# Patient Record
Sex: FEMALE | Race: WHITE | NOT HISPANIC OR LATINO | Employment: OTHER | URBAN - METROPOLITAN AREA
[De-identification: names, ages, dates, MRNs, and addresses within clinical notes are randomized per-mention and may not be internally consistent; named-entity substitution may affect disease eponyms.]

---

## 2023-04-24 ENCOUNTER — OFFICE VISIT (OUTPATIENT)
Dept: PRIMARY CARE | Facility: CLINIC | Age: 68
End: 2023-04-24
Payer: MEDICARE

## 2023-04-24 VITALS
SYSTOLIC BLOOD PRESSURE: 134 MMHG | BODY MASS INDEX: 24.5 KG/M2 | HEART RATE: 66 BPM | HEIGHT: 67 IN | DIASTOLIC BLOOD PRESSURE: 82 MMHG | WEIGHT: 156.1 LBS | RESPIRATION RATE: 16 BRPM | OXYGEN SATURATION: 95 % | TEMPERATURE: 97 F

## 2023-04-24 DIAGNOSIS — Z86.010 HX OF ADENOMATOUS COLONIC POLYPS: ICD-10-CM

## 2023-04-24 DIAGNOSIS — M85.80 OSTEOPENIA AFTER MENOPAUSE: ICD-10-CM

## 2023-04-24 DIAGNOSIS — G89.29 CHRONIC BILATERAL LOW BACK PAIN, UNSPECIFIED WHETHER SCIATICA PRESENT: ICD-10-CM

## 2023-04-24 DIAGNOSIS — M47.815 OSTEOARTHRITIS OF THORACOLUMBAR SPINE, UNSPECIFIED SPINAL OSTEOARTHRITIS COMPLICATION STATUS: ICD-10-CM

## 2023-04-24 DIAGNOSIS — Z78.0 OSTEOPENIA AFTER MENOPAUSE: ICD-10-CM

## 2023-04-24 DIAGNOSIS — I10 PRIMARY HYPERTENSION: Primary | ICD-10-CM

## 2023-04-24 DIAGNOSIS — M48.07 SPINAL STENOSIS OF LUMBOSACRAL REGION: ICD-10-CM

## 2023-04-24 DIAGNOSIS — M54.50 CHRONIC BILATERAL LOW BACK PAIN, UNSPECIFIED WHETHER SCIATICA PRESENT: ICD-10-CM

## 2023-04-24 PROBLEM — E78.5 HYPERLIPEMIA: Status: ACTIVE | Noted: 2023-04-24

## 2023-04-24 PROCEDURE — 3079F DIAST BP 80-89 MM HG: CPT | Performed by: FAMILY MEDICINE

## 2023-04-24 PROCEDURE — 3075F SYST BP GE 130 - 139MM HG: CPT | Performed by: FAMILY MEDICINE

## 2023-04-24 PROCEDURE — 99215 OFFICE O/P EST HI 40 MIN: CPT | Performed by: FAMILY MEDICINE

## 2023-04-24 PROCEDURE — 1036F TOBACCO NON-USER: CPT | Performed by: FAMILY MEDICINE

## 2023-04-24 PROCEDURE — 1160F RVW MEDS BY RX/DR IN RCRD: CPT | Performed by: FAMILY MEDICINE

## 2023-04-24 PROCEDURE — 1159F MED LIST DOCD IN RCRD: CPT | Performed by: FAMILY MEDICINE

## 2023-04-24 RX ORDER — SULFACETAMIDE SODIUM, SULFUR 98; 48 MG/G; MG/G
LIQUID TOPICAL
COMMUNITY
Start: 2021-04-22

## 2023-04-24 RX ORDER — VIT C/E/ZN/COPPR/LUTEIN/ZEAXAN 250MG-90MG
1 CAPSULE ORAL DAILY
COMMUNITY
Start: 2021-04-22

## 2023-04-24 RX ORDER — ACETAMINOPHEN AND PHENYLEPHRINE HCL 325; 5 MG/1; MG/1
10000 TABLET ORAL
COMMUNITY

## 2023-04-24 RX ORDER — LISINOPRIL 10 MG/1
1 TABLET ORAL DAILY
COMMUNITY
Start: 2023-01-24 | End: 2023-04-24 | Stop reason: SDUPTHER

## 2023-04-24 RX ORDER — MULTIVITAMIN
1 TABLET ORAL DAILY
COMMUNITY

## 2023-04-24 RX ORDER — IVERMECTIN 10 MG/G
1 CREAM TOPICAL
COMMUNITY
Start: 2022-08-08 | End: 2023-08-08

## 2023-04-24 RX ORDER — CLOBETASOL PROPIONATE 0.5 MG/G
1 CREAM TOPICAL 2 TIMES DAILY
COMMUNITY
Start: 2021-03-25

## 2023-04-24 RX ORDER — LISINOPRIL 20 MG/1
20 TABLET ORAL DAILY
Qty: 90 TABLET | Refills: 0 | Status: SHIPPED | OUTPATIENT
Start: 2023-04-24 | End: 2023-05-23 | Stop reason: SDUPTHER

## 2023-04-24 ASSESSMENT — ENCOUNTER SYMPTOMS
NAUSEA: 1
LOSS OF SENSATION IN FEET: 0
HYPERTENSION: 1
HEADACHES: 1
DEPRESSION: 0
OCCASIONAL FEELINGS OF UNSTEADINESS: 0

## 2023-04-24 ASSESSMENT — PATIENT HEALTH QUESTIONNAIRE - PHQ9
1. LITTLE INTEREST OR PLEASURE IN DOING THINGS: NOT AT ALL
SUM OF ALL RESPONSES TO PHQ9 QUESTIONS 1 AND 2: 0
2. FEELING DOWN, DEPRESSED OR HOPELESS: NOT AT ALL

## 2023-04-24 NOTE — PATIENT INSTRUCTIONS
April 24, 2023    Clay Maxwell  [address] [facility] [address]      Dear Ms. Maxwell:     To help you more effectively manage your high blood pressure, we would like to remind you of the following preventive measures you can take at home:    1) Eat right: Your diet should be rich in fruits, vegetables, potassium, and low-fat dairy products. You should also reduce your intake of sodium and fats, particularly saturated fats.    2) Maintain a healthy weight: Try to achieve and maintain a healthy weight. If you are unsure what this means for you, please contact our clinic.    3) Exercise: Try to get at least 30 minutes of aerobic exercise every day.    4) Moderate your alcohol consumption: Limit your alcohol intake to one drink per day.    5) Monitor your blood pressure: You should check your blood pressure regularly. Notify our clinic if your blood pressure readings are consistently higher than recommended.    We have included a personalized hypertension report card on the following page that lists your most recent blood pressure readings and lab results, along with your ideal values. If you have any questions or concerns about these instructions, your results, or your improving health, please don't hesitate to call.    Thank you for including us as members of your health care team.    [unfilled]    Sincerely,         Amy Del Valle MD    Enclosure      Hypertension Report Card for Clay Maxwell  April 24, 2023:     Below is a summary of recent tests related to your hypertension that can help you manage your health.     Blood Pressure:   Normal blood pressure values are 120/80 or lower. Your blood pressure values should be less than 140/90. If your readings at home are consistently higher than this, please contact me.     Your most recent blood pressure readings at our clinic were:   BP Readings from Last 3 Encounters:   04/24/23 134/82   07/26/22 120/78   02/01/22 124/82       Creatinine:   High blood  pressure can cause a loss of kidney function. Creatinine is a measure of this kidney function.     Your most recent creatinine levels were:   Creatinine (mg/dL)   Date Value   11/10/2022 0.60   05/27/2021 0.70   10/02/2019 0.65           Potassium:  Potassium is an electrolyte in your blood that can be affected by blood pressure treatment.    Your most recent potassium levels were:  Potassium (mmol/L)   Date Value   11/10/2022 3.8   05/27/2021 4.5   10/02/2019 4.2

## 2023-04-24 NOTE — ASSESSMENT & PLAN NOTE
Hypertension Assessment  Primary hypertension [I10]  Discussion: stage 1  Cardiovascular risk factors: advanced age (older than 55 for men, 65 for women) and family history of premature cardiovascular disease    Hypertension Plan  Medication changes per orders.  Dietary sodium restriction.  Regular aerobic exercise.  Follow up in 1 month.  Patient Education: Reviewed risks of hypertension and principles of   treatment.  Counseling time: counseling time more than 50% of visit: 45 minutes.

## 2023-04-24 NOTE — PROGRESS NOTES
"Subjective   Patient ID: Clay Maxwell is a 67 y.o. female who presents for 3 month follow up  (Review MRI results and Bone Density).  Today she is accompanied by alone. DEXA Scan and MRI scans of Lumbosacral spine and thoracic spine reviewed and discussed in detail.     Hypertension  This is a chronic problem. The current episode started more than 1 year ago. The problem is unchanged (more challenging as she is getting older and if/when gains weight). The problem is controlled. Associated symptoms include headaches. (Jitteriness, nausea; headaches when BP elevated >140/90 mmHg, ) Associated agents: caffeine, diet soda. Risk factors for coronary artery disease include family history and dyslipidemia. Past treatments include ACE inhibitors and lifestyle changes. The current treatment provides significant improvement. There are no compliance problems.      She has had chronic LBP for years, stretches help. Minimal radiation of pain to anterior proximal thigh b/l. PT, remaining active and weight loss have helped alleviate pain moderately.       Review of Systems   Gastrointestinal:  Positive for nausea.   Neurological:  Positive for headaches.        Headaches only when bp >140 mmHg, episodes last ~ 3-4 hour long in AM; dull ache, 2-3/10.    Psychiatric/Behavioral:          Jitteriness    All other systems reviewed and are negative.      Objective   /82   Pulse 66   Temp 36.1 °C (97 °F)   Resp 16   Ht 1.695 m (5' 6.75\")   Wt 70.8 kg (156 lb 1.6 oz)   SpO2 95%   BMI 24.63 kg/m²   BSA: 1.83 meters squared  Growth percentiles: Facility age limit for growth %justen is 20 years. Facility age limit for growth %justen is 20 years.     Physical Exam  Vitals and nursing note reviewed.   Constitutional:       General: She is not in acute distress.     Appearance: Normal appearance. She is normal weight. She is not ill-appearing or toxic-appearing.   HENT:      Head: Normocephalic.      Right Ear: Tympanic membrane, ear " canal and external ear normal. There is no impacted cerumen.      Left Ear: Tympanic membrane, ear canal and external ear normal. There is no impacted cerumen.      Nose: Nose normal. No congestion or rhinorrhea.      Mouth/Throat:      Mouth: Mucous membranes are moist.      Pharynx: Oropharynx is clear. No oropharyngeal exudate or posterior oropharyngeal erythema.   Eyes:      General: No scleral icterus.        Right eye: No discharge.         Left eye: No discharge.      Extraocular Movements: Extraocular movements intact.      Conjunctiva/sclera: Conjunctivae normal.      Pupils: Pupils are equal, round, and reactive to light.   Neck:      Vascular: No carotid bruit.   Cardiovascular:      Rate and Rhythm: Normal rate and regular rhythm.      Pulses: Normal pulses.      Heart sounds: Normal heart sounds. No murmur heard.     No gallop.   Pulmonary:      Effort: No respiratory distress.      Breath sounds: No wheezing or rhonchi.   Chest:      Chest wall: No tenderness.   Abdominal:      General: Abdomen is flat. Bowel sounds are normal.      Palpations: Abdomen is soft. There is no mass.      Tenderness: There is no abdominal tenderness. There is no guarding or rebound.      Hernia: No hernia is present.   Musculoskeletal:         General: No swelling or tenderness. Normal range of motion.      Cervical back: Normal range of motion. No rigidity or tenderness.      Right lower leg: No edema.      Left lower leg: No edema.   Lymphadenopathy:      Cervical: No cervical adenopathy.   Skin:     General: Skin is warm and dry.      Coloration: Skin is not pale.      Findings: No bruising, erythema or rash.   Neurological:      General: No focal deficit present.      Mental Status: She is alert and oriented to person, place, and time.      Sensory: No sensory deficit.      Coordination: Coordination normal.      Gait: Gait normal.      Deep Tendon Reflexes: Reflexes normal.   Psychiatric:         Mood and Affect: Mood  normal.         Behavior: Behavior normal.         Thought Content: Thought content normal.         Judgment: Judgment normal.         Assessment/Plan   Problem List Items Addressed This Visit       Primary hypertension - Primary (Chronic)     Hypertension Assessment  Primary hypertension [I10]  Discussion: stage 1  Cardiovascular risk factors: advanced age (older than 55 for men, 65 for women) and family history of premature cardiovascular disease    Hypertension Plan  Medication changes per orders.  Dietary sodium restriction.  Regular aerobic exercise.  Follow up in 1 month.  Patient Education: Reviewed risks of hypertension and principles of   treatment.  Counseling time: counseling time more than 50% of visit: 45 minutes.         Relevant Medications    lisinopril 20 mg tablet    Other Relevant Orders    Follow Up In [facility] - PCP    Hx of adenomatous colonic polyps    Chronic bilateral low back pain    Spinal stenosis of lumbosacral region     Assessment/Plan   Spinal stenosis.  Natural history and expected course discussed. Questions answered.  Proper lifting, bending technique discussed.  Stretching exercises discussed.  Regular aerobic and trunk strengthening exercises discussed.  MRI of the affected area discussed.  OTC analgesics as needed.  Follow-up in  as needed if develops pain.  .         Osteoarthritis of thoracolumbar spine    Osteopenia after menopause

## 2023-04-24 NOTE — ASSESSMENT & PLAN NOTE
Assessment/Plan   Spinal stenosis.  Natural history and expected course discussed. Questions answered.  Proper lifting, bending technique discussed.  Stretching exercises discussed.  Regular aerobic and trunk strengthening exercises discussed.  MRI of the affected area discussed.  OTC analgesics as needed.  Follow-up in  as needed if develops pain.  .

## 2023-05-22 ASSESSMENT — ENCOUNTER SYMPTOMS
NECK PAIN: 0
PND: 0
ORTHOPNEA: 0
SHORTNESS OF BREATH: 0
HYPERTENSION: 1
HEADACHES: 0
PALPITATIONS: 0
BLURRED VISION: 0
SWEATS: 0

## 2023-05-23 ENCOUNTER — OFFICE VISIT (OUTPATIENT)
Dept: PRIMARY CARE | Facility: CLINIC | Age: 68
End: 2023-05-23
Payer: MEDICARE

## 2023-05-23 ENCOUNTER — TELEPHONE (OUTPATIENT)
Dept: PRIMARY CARE | Facility: CLINIC | Age: 68
End: 2023-05-23

## 2023-05-23 VITALS
TEMPERATURE: 97.2 F | OXYGEN SATURATION: 98 % | SYSTOLIC BLOOD PRESSURE: 122 MMHG | WEIGHT: 157.4 LBS | HEART RATE: 74 BPM | RESPIRATION RATE: 16 BRPM | HEIGHT: 66 IN | BODY MASS INDEX: 25.3 KG/M2 | DIASTOLIC BLOOD PRESSURE: 78 MMHG

## 2023-05-23 DIAGNOSIS — I10 PRIMARY HYPERTENSION: Chronic | ICD-10-CM

## 2023-05-23 DIAGNOSIS — I10 PRIMARY HYPERTENSION: ICD-10-CM

## 2023-05-23 DIAGNOSIS — E53.8 VITAMIN B 12 DEFICIENCY: ICD-10-CM

## 2023-05-23 DIAGNOSIS — D64.9 ANEMIA, UNSPECIFIED TYPE: ICD-10-CM

## 2023-05-23 DIAGNOSIS — E78.2 MIXED HYPERLIPIDEMIA: ICD-10-CM

## 2023-05-23 DIAGNOSIS — E55.9 VITAMIN D DEFICIENCY: ICD-10-CM

## 2023-05-23 PROCEDURE — 1159F MED LIST DOCD IN RCRD: CPT | Performed by: FAMILY MEDICINE

## 2023-05-23 PROCEDURE — 3078F DIAST BP <80 MM HG: CPT | Performed by: FAMILY MEDICINE

## 2023-05-23 PROCEDURE — 1160F RVW MEDS BY RX/DR IN RCRD: CPT | Performed by: FAMILY MEDICINE

## 2023-05-23 PROCEDURE — 3074F SYST BP LT 130 MM HG: CPT | Performed by: FAMILY MEDICINE

## 2023-05-23 PROCEDURE — 99214 OFFICE O/P EST MOD 30 MIN: CPT | Performed by: FAMILY MEDICINE

## 2023-05-23 PROCEDURE — 1036F TOBACCO NON-USER: CPT | Performed by: FAMILY MEDICINE

## 2023-05-23 RX ORDER — LISINOPRIL 20 MG/1
20 TABLET ORAL DAILY
Qty: 90 TABLET | Refills: 1 | Status: SHIPPED | OUTPATIENT
Start: 2023-05-23 | End: 2023-12-05 | Stop reason: SDUPTHER

## 2023-05-23 ASSESSMENT — ENCOUNTER SYMPTOMS
HEADACHES: 0
NECK PAIN: 0
DEPRESSION: 0
SWEATS: 0
PALPITATIONS: 0
OCCASIONAL FEELINGS OF UNSTEADINESS: 0
ORTHOPNEA: 0
SHORTNESS OF BREATH: 0
HYPERTENSION: 1
PND: 0
LOSS OF SENSATION IN FEET: 0
BLURRED VISION: 0

## 2023-05-23 ASSESSMENT — PATIENT HEALTH QUESTIONNAIRE - PHQ9
1. LITTLE INTEREST OR PLEASURE IN DOING THINGS: NOT AT ALL
2. FEELING DOWN, DEPRESSED OR HOPELESS: NOT AT ALL
SUM OF ALL RESPONSES TO PHQ9 QUESTIONS 1 AND 2: 0

## 2023-05-23 NOTE — PROGRESS NOTES
"Subjective   Patient ID: Clay Maxwell is a 67 y.o. female who presents for follow up on hypertension.  Today she is accompanied by alone.     Hypertension  This is a recurrent problem. The current episode started more than 1 year ago. The problem has been waxing and waning since onset. The problem is controlled. Pertinent negatives include no anxiety, blurred vision, chest pain, headaches, malaise/fatigue, neck pain, orthopnea, palpitations, peripheral edema, PND, shortness of breath or sweats. Agents associated with hypertension include NSAIDs. Risk factors for coronary artery disease include dyslipidemia, family history and post-menopausal state. There are no compliance problems.        Review of Systems   Constitutional:  Negative for malaise/fatigue.   Eyes:  Negative for blurred vision.   Respiratory:  Negative for shortness of breath.    Cardiovascular:  Negative for chest pain, palpitations, orthopnea and PND.   Musculoskeletal:  Negative for neck pain.   Neurological:  Negative for headaches.   All other systems reviewed and are negative.      Objective   Blood Pressure 122/78   Pulse 74   Temperature 36.2 °C (97.2 °F)   Respiration 16   Height 1.676 m (5' 6\")   Weight 71.4 kg (157 lb 6.4 oz)   Oxygen Saturation 98%   Body Mass Index 25.41 kg/m²   BSA: 1.82 meters squared  Growth percentiles: Facility age limit for growth %justen is 20 years. Facility age limit for growth %justen is 20 years.     Physical Exam  Vitals and nursing note reviewed.   Constitutional:       General: She is not in acute distress.     Appearance: Normal appearance. She is normal weight. She is not ill-appearing.   HENT:      Head: Normocephalic.      Right Ear: Tympanic membrane, ear canal and external ear normal.      Left Ear: Tympanic membrane, ear canal and external ear normal.      Nose: Nose normal. No rhinorrhea.      Mouth/Throat:      Mouth: Mucous membranes are moist.      Pharynx: Oropharynx is clear.   Eyes:      " Extraocular Movements: Extraocular movements intact.      Conjunctiva/sclera: Conjunctivae normal.      Pupils: Pupils are equal, round, and reactive to light.   Cardiovascular:      Rate and Rhythm: Normal rate and regular rhythm.      Pulses: Normal pulses.      Heart sounds: Normal heart sounds.   Pulmonary:      Effort: Pulmonary effort is normal. No respiratory distress.      Breath sounds: Normal breath sounds. No wheezing.   Abdominal:      General: Abdomen is flat. Bowel sounds are normal.      Palpations: Abdomen is soft.      Tenderness: There is no abdominal tenderness. There is no guarding or rebound.      Hernia: No hernia is present.   Musculoskeletal:         General: Normal range of motion.      Cervical back: Normal range of motion and neck supple. No rigidity or tenderness.      Right lower leg: No edema.      Left lower leg: No edema.   Lymphadenopathy:      Cervical: No cervical adenopathy.   Skin:     General: Skin is warm and dry.      Capillary Refill: Capillary refill takes more than 3 seconds.   Neurological:      General: No focal deficit present.      Mental Status: She is alert and oriented to person, place, and time.      Sensory: No sensory deficit.      Motor: No weakness.      Coordination: Coordination normal.   Psychiatric:         Mood and Affect: Mood normal.         Behavior: Behavior normal.         Thought Content: Thought content normal.         Judgment: Judgment normal.         Assessment/Plan   Problem List Items Addressed This Visit       Primary hypertension (Chronic)    Relevant Medications    lisinopril 20 mg tablet

## 2023-05-23 NOTE — PATIENT INSTRUCTIONS
F/up in 6 months for Medicare  PE and HTN   FBW prior to next visit  Pneumonia shot next visit      May 23, 2023    Clay Maxwell  [address] [facility] [address]      Dear Ms. Maxwell:     To help you more effectively manage your high blood pressure, we would like to remind you of the following preventive measures you can take at home:    1) Eat right: Your diet should be rich in fruits, vegetables, potassium, and low-fat dairy products. You should also reduce your intake of sodium and fats, particularly saturated fats.    2) Maintain a healthy weight: Try to achieve and maintain a healthy weight. If you are unsure what this means for you, please contact our clinic.    3) Exercise: Try to get at least 30 minutes of aerobic exercise every day.    4) Moderate your alcohol consumption: Limit your alcohol intake to one drink per day.    5) Monitor your blood pressure: You should check your blood pressure regularly. Notify our clinic if your blood pressure readings are consistently higher than recommended.    We have included a personalized hypertension report card on the following page that lists your most recent blood pressure readings and lab results, along with your ideal values. If you have any questions or concerns about these instructions, your results, or your improving health, please don't hesitate to call.    Thank you for including us as members of your health care team.    [unfilled]    Sincerely,         Amy Del Valle MD    Enclosure      Hypertension Report Card for Clay Maxwell  May 23, 2023:     Below is a summary of recent tests related to your hypertension that can help you manage your health.     Blood Pressure:   Normal blood pressure values are 120/80 or lower. Your blood pressure values should be less than 140/90. If your readings at home are consistently higher than this, please contact me.     Your most recent blood pressure readings at our clinic were:   BP Readings from Last 3  Encounters:   05/23/23 122/78   04/24/23 134/82   01/24/23 124/80       Creatinine:   High blood pressure can cause a loss of kidney function. Creatinine is a measure of this kidney function.      Your most recent creatinine levels were:   Creatinine (mg/dL)   Date Value   11/10/2022 0.60   05/27/2021 0.70   10/02/2019 0.65           Potassium:  Potassium is an electrolyte in your blood that can be affected by blood pressure treatment.       Your most recent potassium levels were:  Potassium (mmol/L)   Date Value   11/10/2022 3.8   05/27/2021 4.5   10/02/2019 4.2

## 2023-11-14 ENCOUNTER — APPOINTMENT (OUTPATIENT)
Dept: PRIMARY CARE | Facility: CLINIC | Age: 68
End: 2023-11-14
Payer: MEDICARE

## 2023-11-21 ENCOUNTER — APPOINTMENT (OUTPATIENT)
Dept: PRIMARY CARE | Facility: CLINIC | Age: 68
End: 2023-11-21
Payer: MEDICARE

## 2023-11-30 ENCOUNTER — LAB (OUTPATIENT)
Dept: LAB | Facility: LAB | Age: 68
End: 2023-11-30
Payer: MEDICARE

## 2023-11-30 DIAGNOSIS — D64.9 ANEMIA, UNSPECIFIED TYPE: ICD-10-CM

## 2023-11-30 DIAGNOSIS — E78.2 MIXED HYPERLIPIDEMIA: ICD-10-CM

## 2023-11-30 DIAGNOSIS — E53.8 VITAMIN B 12 DEFICIENCY: ICD-10-CM

## 2023-11-30 DIAGNOSIS — I10 PRIMARY HYPERTENSION: Chronic | ICD-10-CM

## 2023-11-30 DIAGNOSIS — E55.9 VITAMIN D DEFICIENCY: ICD-10-CM

## 2023-11-30 LAB
25(OH)D3 SERPL-MCNC: 58 NG/ML (ref 30–100)
ALBUMIN SERPL BCP-MCNC: 4.4 G/DL (ref 3.4–5)
ALP SERPL-CCNC: 71 U/L (ref 33–136)
ALT SERPL W P-5'-P-CCNC: 15 U/L (ref 7–45)
ANION GAP SERPL CALC-SCNC: 13 MMOL/L (ref 10–20)
AST SERPL W P-5'-P-CCNC: 22 U/L (ref 9–39)
BASOPHILS # BLD AUTO: 0.03 X10*3/UL (ref 0–0.1)
BASOPHILS NFR BLD AUTO: 0.6 %
BILIRUB SERPL-MCNC: 0.6 MG/DL (ref 0–1.2)
BUN SERPL-MCNC: 13 MG/DL (ref 6–23)
CALCIUM SERPL-MCNC: 9.5 MG/DL (ref 8.6–10.6)
CHLORIDE SERPL-SCNC: 99 MMOL/L (ref 98–107)
CHOLEST SERPL-MCNC: 175 MG/DL (ref 0–199)
CHOLESTEROL/HDL RATIO: 4.1
CO2 SERPL-SCNC: 29 MMOL/L (ref 21–32)
CREAT SERPL-MCNC: 0.58 MG/DL (ref 0.5–1.05)
EOSINOPHIL # BLD AUTO: 0.06 X10*3/UL (ref 0–0.7)
EOSINOPHIL NFR BLD AUTO: 1.2 %
ERYTHROCYTE [DISTWIDTH] IN BLOOD BY AUTOMATED COUNT: 13.2 % (ref 11.5–14.5)
GFR SERPL CREATININE-BSD FRML MDRD: >90 ML/MIN/1.73M*2
GLUCOSE SERPL-MCNC: 81 MG/DL (ref 74–99)
HCT VFR BLD AUTO: 40.1 % (ref 36–46)
HDLC SERPL-MCNC: 42.7 MG/DL
HGB BLD-MCNC: 13 G/DL (ref 12–16)
IMM GRANULOCYTES # BLD AUTO: 0.01 X10*3/UL (ref 0–0.7)
IMM GRANULOCYTES NFR BLD AUTO: 0.2 % (ref 0–0.9)
LDLC SERPL CALC-MCNC: 98 MG/DL
LYMPHOCYTES # BLD AUTO: 1.35 X10*3/UL (ref 1.2–4.8)
LYMPHOCYTES NFR BLD AUTO: 27.9 %
MCH RBC QN AUTO: 30 PG (ref 26–34)
MCHC RBC AUTO-ENTMCNC: 32.4 G/DL (ref 32–36)
MCV RBC AUTO: 93 FL (ref 80–100)
MONOCYTES # BLD AUTO: 0.56 X10*3/UL (ref 0.1–1)
MONOCYTES NFR BLD AUTO: 11.6 %
NEUTROPHILS # BLD AUTO: 2.83 X10*3/UL (ref 1.2–7.7)
NEUTROPHILS NFR BLD AUTO: 58.5 %
NON HDL CHOLESTEROL: 132 MG/DL (ref 0–149)
NRBC BLD-RTO: 0 /100 WBCS (ref 0–0)
PLATELET # BLD AUTO: 242 X10*3/UL (ref 150–450)
POTASSIUM SERPL-SCNC: 3.9 MMOL/L (ref 3.5–5.3)
PROT SERPL-MCNC: 7.2 G/DL (ref 6.4–8.2)
RBC # BLD AUTO: 4.33 X10*6/UL (ref 4–5.2)
SODIUM SERPL-SCNC: 137 MMOL/L (ref 136–145)
TRIGL SERPL-MCNC: 172 MG/DL (ref 0–149)
TSH SERPL-ACNC: 2.07 MIU/L (ref 0.44–3.98)
VIT B12 SERPL-MCNC: 495 PG/ML (ref 211–911)
VLDL: 34 MG/DL (ref 0–40)
WBC # BLD AUTO: 4.8 X10*3/UL (ref 4.4–11.3)

## 2023-11-30 PROCEDURE — 82607 VITAMIN B-12: CPT

## 2023-11-30 PROCEDURE — 85025 COMPLETE CBC W/AUTO DIFF WBC: CPT

## 2023-11-30 PROCEDURE — 80061 LIPID PANEL: CPT

## 2023-11-30 PROCEDURE — 36415 COLL VENOUS BLD VENIPUNCTURE: CPT

## 2023-11-30 PROCEDURE — 84443 ASSAY THYROID STIM HORMONE: CPT

## 2023-11-30 PROCEDURE — 82306 VITAMIN D 25 HYDROXY: CPT

## 2023-11-30 PROCEDURE — 80053 COMPREHEN METABOLIC PANEL: CPT

## 2023-12-05 ENCOUNTER — OFFICE VISIT (OUTPATIENT)
Dept: PRIMARY CARE | Facility: CLINIC | Age: 68
End: 2023-12-05
Payer: MEDICARE

## 2023-12-05 ENCOUNTER — ANCILLARY PROCEDURE (OUTPATIENT)
Dept: RADIOLOGY | Facility: CLINIC | Age: 68
End: 2023-12-05
Payer: MEDICARE

## 2023-12-05 VITALS
SYSTOLIC BLOOD PRESSURE: 126 MMHG | OXYGEN SATURATION: 96 % | HEART RATE: 68 BPM | HEIGHT: 66 IN | WEIGHT: 154.5 LBS | DIASTOLIC BLOOD PRESSURE: 78 MMHG | BODY MASS INDEX: 24.83 KG/M2

## 2023-12-05 DIAGNOSIS — M54.42 CHRONIC BILATERAL LOW BACK PAIN WITH LEFT-SIDED SCIATICA: ICD-10-CM

## 2023-12-05 DIAGNOSIS — M54.50 ACUTE BILATERAL LOW BACK PAIN, UNSPECIFIED WHETHER SCIATICA PRESENT: ICD-10-CM

## 2023-12-05 DIAGNOSIS — M48.07 SPINAL STENOSIS OF LUMBOSACRAL REGION: ICD-10-CM

## 2023-12-05 DIAGNOSIS — Z13.89 SCREENING FOR MULTIPLE CONDITIONS: ICD-10-CM

## 2023-12-05 DIAGNOSIS — Z71.89 CARDIAC RISK COUNSELING: ICD-10-CM

## 2023-12-05 DIAGNOSIS — Z00.00 ROUTINE GENERAL MEDICAL EXAMINATION AT HEALTH CARE FACILITY: Primary | ICD-10-CM

## 2023-12-05 DIAGNOSIS — G89.29 CHRONIC BILATERAL LOW BACK PAIN WITH LEFT-SIDED SCIATICA: ICD-10-CM

## 2023-12-05 DIAGNOSIS — E78.2 MIXED HYPERLIPIDEMIA: ICD-10-CM

## 2023-12-05 DIAGNOSIS — I10 ESSENTIAL (PRIMARY) HYPERTENSION: ICD-10-CM

## 2023-12-05 DIAGNOSIS — Z23 NEED FOR VACCINATION: ICD-10-CM

## 2023-12-05 PROCEDURE — 99214 OFFICE O/P EST MOD 30 MIN: CPT | Performed by: FAMILY MEDICINE

## 2023-12-05 PROCEDURE — 72110 X-RAY EXAM L-2 SPINE 4/>VWS: CPT | Performed by: STUDENT IN AN ORGANIZED HEALTH CARE EDUCATION/TRAINING PROGRAM

## 2023-12-05 PROCEDURE — 1033F TOBACCO NONSMOKER NOR 2NDHND: CPT | Performed by: FAMILY MEDICINE

## 2023-12-05 PROCEDURE — G0439 PPPS, SUBSEQ VISIT: HCPCS | Performed by: FAMILY MEDICINE

## 2023-12-05 PROCEDURE — G0009 ADMIN PNEUMOCOCCAL VACCINE: HCPCS | Performed by: FAMILY MEDICINE

## 2023-12-05 PROCEDURE — 3078F DIAST BP <80 MM HG: CPT | Performed by: FAMILY MEDICINE

## 2023-12-05 PROCEDURE — G0446 INTENS BEHAVE THER CARDIO DX: HCPCS | Performed by: FAMILY MEDICINE

## 2023-12-05 PROCEDURE — 1159F MED LIST DOCD IN RCRD: CPT | Performed by: FAMILY MEDICINE

## 2023-12-05 PROCEDURE — G0444 DEPRESSION SCREEN ANNUAL: HCPCS | Performed by: FAMILY MEDICINE

## 2023-12-05 PROCEDURE — 1160F RVW MEDS BY RX/DR IN RCRD: CPT | Performed by: FAMILY MEDICINE

## 2023-12-05 PROCEDURE — 3074F SYST BP LT 130 MM HG: CPT | Performed by: FAMILY MEDICINE

## 2023-12-05 PROCEDURE — 1170F FXNL STATUS ASSESSED: CPT | Performed by: FAMILY MEDICINE

## 2023-12-05 PROCEDURE — 1036F TOBACCO NON-USER: CPT | Performed by: FAMILY MEDICINE

## 2023-12-05 PROCEDURE — 99497 ADVNCD CARE PLAN 30 MIN: CPT | Performed by: FAMILY MEDICINE

## 2023-12-05 PROCEDURE — 90677 PCV20 VACCINE IM: CPT | Performed by: FAMILY MEDICINE

## 2023-12-05 PROCEDURE — 72110 X-RAY EXAM L-2 SPINE 4/>VWS: CPT

## 2023-12-05 RX ORDER — ACETAMINOPHEN 500 MG
500 TABLET ORAL 2 TIMES DAILY
COMMUNITY

## 2023-12-05 RX ORDER — LISINOPRIL 20 MG/1
20 TABLET ORAL DAILY
Qty: 90 TABLET | Refills: 1 | Status: SHIPPED | OUTPATIENT
Start: 2023-12-05

## 2023-12-05 RX ORDER — METHYLPREDNISOLONE 4 MG/1
TABLET ORAL
Qty: 21 TABLET | Refills: 0 | Status: SHIPPED | OUTPATIENT
Start: 2023-12-05 | End: 2023-12-12

## 2023-12-05 RX ORDER — FAMOTIDINE 20 MG/1
20 TABLET, FILM COATED ORAL 2 TIMES DAILY
Qty: 14 TABLET | Refills: 0 | COMMUNITY
Start: 2023-12-05 | End: 2024-01-29 | Stop reason: WASHOUT

## 2023-12-05 ASSESSMENT — ENCOUNTER SYMPTOMS
SLEEP DISTURBANCE: 0
ABDOMINAL PAIN: 0
DEPRESSION: 0
OCCASIONAL FEELINGS OF UNSTEADINESS: 0
COUGH: 0
LOSS OF SENSATION IN FEET: 0
BACK PAIN: 1

## 2023-12-05 ASSESSMENT — ACTIVITIES OF DAILY LIVING (ADL)
BATHING: INDEPENDENT
GROCERY_SHOPPING: INDEPENDENT
DRESSING: INDEPENDENT
TAKING_MEDICATION: INDEPENDENT
MANAGING_FINANCES: INDEPENDENT
DOING_HOUSEWORK: INDEPENDENT

## 2023-12-05 NOTE — PROGRESS NOTES
Subjective   Reason for Visit: Margaret Maxwell is an 68 y.o. female here for a Medicare Wellness visit.     Past Medical, Surgical, and Family History reviewed and updated in chart.    Reviewed all medications by prescribing practitioner or clinical pharmacist (such as prescriptions, OTCs, herbal therapies and supplements) and documented in the medical record.    67 y/o female presents for her medicare annual wellness visit. Her only complaint at this time is left posterior leg pain radiating from her lower back that started about 3 weeks ago. She has a history of lower lumbar problems but this radiating pain is new. It is relieved by stretching but has been occurring frequently over the last 2-3 weeks. She has also occasionally tried heat pads and ice packs with mild improvement as well as extra strength Tylenol. She feels as though her balance is better on her right leg and she sometimes has trouble bearing full weight on her left leg due to pain. She denies any recent injury and reports that she does not do any lifting. She also notes some burning in her left scapular region that is new. She denies any bowel or bladder incontinence, numbness or tingling and she has no trouble sleeping at night or doing her daily activities.     Back Pain  Pertinent negatives include no abdominal pain or chest pain.         Advance Care Planning Note     Discussion Date: 12/05/23   Discussion Participants: patient    The patient wishes to discuss Advance Care Planning today and the following is a brief summary of our discussion.     Patient has capacity to make their own medical decisions: Yes  Health Care Agent/Surrogate Decision Maker documented in chart: Yes    Documents on file and valid:  Advance Directive/Living Will: No   Health Care Power of : No  Other: full code discussed    Communication of Medical Status/Prognosis:   great     Communication of Treatment Goals/Options:   Needs to bring in documents  "    Treatment Decisions  Goals of Care: survival is prioritized, if goals for quality or survival can reasonably be achieved   agree  Follow Up Plan  Bring in documents  Team Members  pcp  Time Statement: Total face to face time spent on advance care planning was 16 minutes with 16 minutes spent in counseling, including the explanation.    Amy Del Valle MD  12/5/2023 9:55 AM  Patient Care Team:  Amy Del Valle MD as PCP - General  Amy Del Valle MD as PCP - [facility] Attributed Provider   Immunization History   Administered Date(s) Administered    Flu vaccine (IIV4), preservative free *Check age/dose* 10/27/2017    Flu vaccine, quadrivalent, high-dose, preservative free, age 65y+ (FLUZONE) 10/03/2016, 09/25/2020    Influenza, High Dose Seasonal, Preservative Free 09/17/2021, 10/18/2022    Influenza, Seasonal, Quadrivalent, Adjuvanted 11/03/2023    Influenza, injectable, MDCK, preservative free, quadrivalent 10/19/2018    Influenza, injectable, quadrivalent 10/03/2016, 10/19/2018, 09/09/2019    Pfizer COVID-19 vaccine, Fall 2023, 12 years and older, (30mcg/0.3mL) 11/03/2023    Pfizer COVID-19 vaccine, bivalent, age 12 years and older (30 mcg/0.3 mL) 10/18/2022    Pfizer Purple Cap SARS-CoV-2 02/16/2021, 03/09/2021, 09/29/2021    Pneumococcal conjugate vaccine, 13-valent (PREVNAR 13) 04/22/2021    Zoster vaccine, recombinant, adult (SHINGRIX) 12/27/2019, 05/12/2020    Zoster, live 05/23/2013       Review of Systems   Respiratory:  Negative for cough.    Cardiovascular:  Negative for chest pain.   Gastrointestinal:  Negative for abdominal pain.   Genitourinary:         Negative for bladder/bowel incontinence   Musculoskeletal:  Positive for back pain.        Positive for Left scapular pain, radiating left leg pain   Neurological:         Negative for numbness and tingling   Psychiatric/Behavioral:  Negative for sleep disturbance.        Objective   Vitals:  Blood Pressure 126/78   Pulse 68   Height 1.676 m (5' 6\")   " Weight 70.1 kg (154 lb 8 oz)   Last Menstrual Period  (Exact Date)   Oxygen Saturation 96%   Body Mass Index 24.94 kg/m²       Physical Exam  Vitals and nursing note reviewed.   Constitutional:       General: She is not in acute distress.     Appearance: Normal appearance. She is normal weight.   HENT:      Head: Normocephalic and atraumatic.      Right Ear: Tympanic membrane normal.      Left Ear: Tympanic membrane normal.      Nose: Nose normal.      Mouth/Throat:      Mouth: Mucous membranes are moist.   Eyes:      Extraocular Movements: Extraocular movements intact.      Conjunctiva/sclera: Conjunctivae normal.      Pupils: Pupils are equal, round, and reactive to light.   Cardiovascular:      Rate and Rhythm: Normal rate and regular rhythm.      Pulses: Normal pulses.      Heart sounds: Normal heart sounds. No murmur heard.     No friction rub. No gallop.   Pulmonary:      Effort: Pulmonary effort is normal. No respiratory distress.      Breath sounds: Normal breath sounds. No stridor. No wheezing or rhonchi.   Chest:      Chest wall: No tenderness.   Abdominal:      General: Abdomen is flat.      Tenderness: There is no abdominal tenderness.   Musculoskeletal:      Cervical back: Normal range of motion.      Lumbar back: Bony tenderness present. No swelling, edema, deformity, signs of trauma, lacerations, spasms or tenderness. Decreased range of motion. Negative right straight leg raise test and negative left straight leg raise test. No scoliosis.        Back:       Comments: Weakness of the left upper leg extensors   Skin:     General: Skin is warm and dry.      Capillary Refill: Capillary refill takes less than 2 seconds.   Neurological:      General: No focal deficit present.      Mental Status: She is alert and oriented to person, place, and time.   Psychiatric:         Mood and Affect: Mood normal.         Behavior: Behavior normal.         Thought Content: Thought content normal.         Judgment:  Judgment normal.         Assessment/Plan   Problem List Items Addressed This Visit       Essential (primary) hypertension    Relevant Medications    lisinopril 20 mg tablet    Chronic bilateral low back pain    Relevant Medications    methylPREDNISolone (Medrol Dospak) 4 mg tablets    famotidine (Pepcid) 20 mg tablet    Spinal stenosis of lumbosacral region    Relevant Medications    methylPREDNISolone (Medrol Dospak) 4 mg tablets    famotidine (Pepcid) 20 mg tablet    Mixed hyperlipidemia     Other Visit Diagnoses       Routine general medical examination at health care facility    -  Primary    Screening for multiple conditions        Cardiac risk counseling        Need for vaccination        Relevant Orders    Pneumococcal conjugate vaccine 20-valent IM        The 10-year ASCVD risk score (Megan NAGY, et al., 2019) is: 10.1%    Values used to calculate the score:      Age: 68 years      Sex: Female      Is Non- : No      Diabetic: No      Tobacco smoker: No      Systolic Blood Pressure: 126 mmHg      Is BP treated: Yes      HDL Cholesterol: 42.7 mg/dL      Total Cholesterol: 175 mg/dL      BW reviewed  6-8 weeks f/up for low back pain  May need pt if no improvement

## 2023-12-05 NOTE — PATIENT INSTRUCTIONS
BW reviewed  6-8 weeks f/up for low back pain  May need PT if no improvement    Current Outpatient Medications   Medication Sig Dispense Refill    acetaminophen (Tylenol) 500 mg tablet Take 1 tablet (500 mg) by mouth twice a day.      biotin 10,000 mcg capsule Take 1 capsule (10 mg) by mouth once daily.      cholecalciferol (Vitamin D-3) 25 MCG (1000 UT) capsule Take 1 capsule (25 mcg) by mouth once daily.      clobetasol (Temovate) 0.05 % cream Apply 1 Application topically twice a day.      multivitamin tablet Take 1 tablet by mouth once daily.      sulfacetamide sodium-sulfur 9.8-4.8 % cleanser APPLY INCH  apply to face daily      famotidine (Pepcid) 20 mg tablet Take 1 tablet (20 mg) by mouth 2 times a day. 14 tablet 0    lisinopril 20 mg tablet Take 1 tablet (20 mg) by mouth once daily. 90 tablet 1    methylPREDNISolone (Medrol Dospak) 4 mg tablets Take as directed on package. 21 tablet 0     No current facility-administered medications for this visit.

## 2023-12-07 NOTE — RESULT ENCOUNTER NOTE
Has moderate degenerative changes at L4-L5 and L5-S1.  This could be causing the pain that she was describing in the office.  Would like her to contact us if Medrol Dosepak is not working.  Then we will get her into physical therapy program

## 2024-01-28 ASSESSMENT — ENCOUNTER SYMPTOMS
FEVER: 0
ABDOMINAL PAIN: 0
WEAKNESS: 0
PERIANAL NUMBNESS: 0
PARESTHESIAS: 0
NUMBNESS: 0
TINGLING: 0
BACK PAIN: 1
DYSURIA: 0
HEADACHES: 0
BOWEL INCONTINENCE: 0
PARESIS: 0
LEG PAIN: 1
WEIGHT LOSS: 0

## 2024-01-29 ENCOUNTER — OFFICE VISIT (OUTPATIENT)
Dept: PRIMARY CARE | Facility: CLINIC | Age: 69
End: 2024-01-29
Payer: MEDICARE

## 2024-01-29 VITALS
BODY MASS INDEX: 24.86 KG/M2 | WEIGHT: 154 LBS | DIASTOLIC BLOOD PRESSURE: 70 MMHG | TEMPERATURE: 97.4 F | OXYGEN SATURATION: 98 % | HEART RATE: 69 BPM | SYSTOLIC BLOOD PRESSURE: 120 MMHG

## 2024-01-29 DIAGNOSIS — R23.4 BREAST SKIN CHANGES: ICD-10-CM

## 2024-01-29 DIAGNOSIS — R92.8 ABNORMAL MAMMOGRAM: ICD-10-CM

## 2024-01-29 DIAGNOSIS — R21 RASH: ICD-10-CM

## 2024-01-29 DIAGNOSIS — I10 ESSENTIAL (PRIMARY) HYPERTENSION: ICD-10-CM

## 2024-01-29 DIAGNOSIS — G89.29 CHRONIC BILATERAL LOW BACK PAIN WITH LEFT-SIDED SCIATICA: ICD-10-CM

## 2024-01-29 DIAGNOSIS — N64.51 INDURATION OF BREAST: ICD-10-CM

## 2024-01-29 DIAGNOSIS — E78.2 MIXED HYPERLIPIDEMIA: ICD-10-CM

## 2024-01-29 DIAGNOSIS — M48.07 SPINAL STENOSIS OF LUMBOSACRAL REGION: Primary | ICD-10-CM

## 2024-01-29 DIAGNOSIS — M54.42 CHRONIC BILATERAL LOW BACK PAIN WITH LEFT-SIDED SCIATICA: ICD-10-CM

## 2024-01-29 PROBLEM — M43.16 SPONDYLOLISTHESIS AT L4-L5 LEVEL: Status: ACTIVE | Noted: 2023-03-06

## 2024-01-29 PROBLEM — K63.5 POLYP OF COLON: Status: RESOLVED | Noted: 2024-01-29 | Resolved: 2024-01-29

## 2024-01-29 PROCEDURE — 3078F DIAST BP <80 MM HG: CPT | Performed by: FAMILY MEDICINE

## 2024-01-29 PROCEDURE — 1036F TOBACCO NON-USER: CPT | Performed by: FAMILY MEDICINE

## 2024-01-29 PROCEDURE — 1160F RVW MEDS BY RX/DR IN RCRD: CPT | Performed by: FAMILY MEDICINE

## 2024-01-29 PROCEDURE — 1125F AMNT PAIN NOTED PAIN PRSNT: CPT | Performed by: FAMILY MEDICINE

## 2024-01-29 PROCEDURE — 3074F SYST BP LT 130 MM HG: CPT | Performed by: FAMILY MEDICINE

## 2024-01-29 PROCEDURE — 99214 OFFICE O/P EST MOD 30 MIN: CPT | Performed by: FAMILY MEDICINE

## 2024-01-29 PROCEDURE — 1159F MED LIST DOCD IN RCRD: CPT | Performed by: FAMILY MEDICINE

## 2024-01-29 RX ORDER — VALACYCLOVIR HYDROCHLORIDE 1 G/1
1000 TABLET, FILM COATED ORAL 3 TIMES DAILY
Qty: 21 TABLET | Refills: 0 | Status: SHIPPED | OUTPATIENT
Start: 2024-01-29 | End: 2024-02-05

## 2024-01-29 ASSESSMENT — PATIENT HEALTH QUESTIONNAIRE - PHQ9
SUM OF ALL RESPONSES TO PHQ9 QUESTIONS 1 AND 2: 0
2. FEELING DOWN, DEPRESSED OR HOPELESS: NOT AT ALL
1. LITTLE INTEREST OR PLEASURE IN DOING THINGS: NOT AT ALL
10. IF YOU CHECKED OFF ANY PROBLEMS, HOW DIFFICULT HAVE THESE PROBLEMS MADE IT FOR YOU TO DO YOUR WORK, TAKE CARE OF THINGS AT HOME, OR GET ALONG WITH OTHER PEOPLE: NOT DIFFICULT AT ALL

## 2024-01-29 ASSESSMENT — COLUMBIA-SUICIDE SEVERITY RATING SCALE - C-SSRS
2. HAVE YOU ACTUALLY HAD ANY THOUGHTS OF KILLING YOURSELF?: NO
1. IN THE PAST MONTH, HAVE YOU WISHED YOU WERE DEAD OR WISHED YOU COULD GO TO SLEEP AND NOT WAKE UP?: NO
6. HAVE YOU EVER DONE ANYTHING, STARTED TO DO ANYTHING, OR PREPARED TO DO ANYTHING TO END YOUR LIFE?: NO

## 2024-01-29 ASSESSMENT — ENCOUNTER SYMPTOMS
OCCASIONAL FEELINGS OF UNSTEADINESS: 0
PARESTHESIAS: 0
LOSS OF SENSATION IN FEET: 0
BACK PAIN: 1
NUMBNESS: 0
WEIGHT LOSS: 0
LEG PAIN: 1
DEPRESSION: 0
FEVER: 0
ABDOMINAL PAIN: 0
HEADACHES: 0
PERIANAL NUMBNESS: 0
TINGLING: 0
DYSURIA: 0
PARESIS: 0
BOWEL INCONTINENCE: 0
WEAKNESS: 0

## 2024-01-29 ASSESSMENT — PAIN SCALES - GENERAL: PAINLEVEL: 4

## 2024-01-29 NOTE — PATIENT INSTRUCTIONS
MRI of lower back  Referral to spine specialist.   Pain management referral   Continue current medications    Current Outpatient Medications   Medication Sig Dispense Refill    acetaminophen (Tylenol) 500 mg tablet Take 1 tablet (500 mg) by mouth twice a day.      biotin 10,000 mcg capsule Take 1 capsule (10 mg) by mouth once daily.      cholecalciferol (Vitamin D-3) 25 MCG (1000 UT) capsule Take 1 capsule (25 mcg) by mouth once daily.      clobetasol (Temovate) 0.05 % cream Apply 1 Application topically twice a day.      lisinopril 20 mg tablet Take 1 tablet (20 mg) by mouth once daily. 90 tablet 1    multivitamin tablet Take 1 tablet by mouth once daily.      sulfacetamide sodium-sulfur 9.8-4.8 % cleanser APPLY INCH  apply to face daily       No current facility-administered medications for this visit.

## 2024-01-29 NOTE — PROGRESS NOTES
Subjective   Patient ID: Clay Maxwell is a 68 y.o. female who presents for Follow-up (Rash on left bruise, 10 days ago neuro virus, since then shape of stool is in thin ).  Today she is accompanied by alone.     Back Pain  This is a chronic problem. The current episode started more than 1 year ago. The problem occurs daily. The problem has been waxing and waning since onset. The pain is present in the sacro-iliac. The quality of the pain is described as aching and stabbing. The pain radiates to the left thigh. The pain is at a severity of 4/10. The pain is Worse during the day. The symptoms are aggravated by bending and twisting. Stiffness is present In the morning. Associated symptoms include leg pain. Pertinent negatives include no abdominal pain, bladder incontinence, bowel incontinence, chest pain, dysuria, fever, headaches, numbness, paresis, paresthesias, pelvic pain, perianal numbness, tingling, weakness or weight loss. Risk factors include menopause.       Review of Systems   Constitutional:  Negative for fever and weight loss.   Cardiovascular:  Negative for chest pain.   Gastrointestinal:  Negative for abdominal pain and bowel incontinence.   Genitourinary:  Negative for bladder incontinence, dysuria and pelvic pain.   Musculoskeletal:  Positive for back pain.   Neurological:  Negative for tingling, weakness, numbness, headaches and paresthesias.       Objective   Blood Pressure 120/70 (BP Location: Left arm, Patient Position: Sitting, BP Cuff Size: Adult)   Pulse 69   Temperature 36.3 °C (97.4 °F)   Weight 69.9 kg (154 lb)   Oxygen Saturation 98%   Body Mass Index 24.86 kg/m²   BSA: 1.8 meters squared  Growth percentiles: Facility age limit for growth %justen is 20 years. Facility age limit for growth %justen is 20 years.     Physical Exam  Vitals and nursing note reviewed.   Constitutional:       General: She is not in acute distress.     Appearance: Normal appearance. She is normal weight. She is not  ill-appearing.   HENT:      Head: Normocephalic.      Right Ear: Tympanic membrane, ear canal and external ear normal.      Left Ear: Tympanic membrane, ear canal and external ear normal.      Nose: Nose normal. No rhinorrhea.      Mouth/Throat:      Mouth: Mucous membranes are moist.      Pharynx: Oropharynx is clear.   Eyes:      Extraocular Movements: Extraocular movements intact.      Conjunctiva/sclera: Conjunctivae normal.      Pupils: Pupils are equal, round, and reactive to light.   Cardiovascular:      Rate and Rhythm: Normal rate and regular rhythm.      Pulses: Normal pulses.      Heart sounds: Normal heart sounds.   Pulmonary:      Effort: Pulmonary effort is normal. No respiratory distress.      Breath sounds: Normal breath sounds. No wheezing.   Chest:       Abdominal:      General: Abdomen is flat. Bowel sounds are normal.      Palpations: Abdomen is soft.      Tenderness: There is no abdominal tenderness. There is no guarding or rebound.      Hernia: No hernia is present.   Musculoskeletal:         General: Normal range of motion.      Cervical back: Normal range of motion and neck supple. No rigidity or tenderness.      Right lower leg: No edema.      Left lower leg: No edema.   Lymphadenopathy:      Cervical: No cervical adenopathy.   Skin:     General: Skin is warm and dry.   Neurological:      General: No focal deficit present.      Mental Status: She is alert and oriented to person, place, and time.      Sensory: No sensory deficit.      Motor: No weakness.      Coordination: Coordination normal.   Psychiatric:         Mood and Affect: Mood normal.         Behavior: Behavior normal.         Thought Content: Thought content normal.         Judgment: Judgment normal.         Assessment/Plan   Problem List Items Addressed This Visit             ICD-10-CM    Essential (primary) hypertension I10    Chronic bilateral low back pain M54.50, G89.29    Relevant Orders    Referral to Pain Medicine     Spinal stenosis of lumbosacral region - Primary M48.07    Relevant Orders    MR lumbar spine wo IV contrast    Referral to Pain Medicine    Mixed hyperlipidemia E78.2    Relevant Orders    MR lumbar spine wo IV contrast    Rash R21    Relevant Medications    valACYclovir (Valtrex) 1 gram tablet    Other Relevant Orders    BI mammo left diagnostic    BI US breast complete left    Breast skin changes R23.4    Relevant Orders    BI mammo left diagnostic    BI US breast complete left     Other Visit Diagnoses       Diagnosis Codes    Abnormal mammogram     R92.8    Relevant Orders    BI mammo left diagnostic    Induration of breast     N64.51    Relevant Orders    BI US breast complete left          MRI of lower back  Referral to spine specialist.   Pain management referral   Continue current medications    Current Outpatient Medications   Medication Sig Dispense Refill    acetaminophen (Tylenol) 500 mg tablet Take 1 tablet (500 mg) by mouth twice a day.      biotin 10,000 mcg capsule Take 1 capsule (10 mg) by mouth once daily.      cholecalciferol (Vitamin D-3) 25 MCG (1000 UT) capsule Take 1 capsule (25 mcg) by mouth once daily.      clobetasol (Temovate) 0.05 % cream Apply 1 Application topically twice a day.      lisinopril 20 mg tablet Take 1 tablet (20 mg) by mouth once daily. 90 tablet 1    multivitamin tablet Take 1 tablet by mouth once daily.      sulfacetamide sodium-sulfur 9.8-4.8 % cleanser APPLY INCH  apply to face daily      valACYclovir (Valtrex) 1 gram tablet Take 1 tablet (1,000 mg) by mouth 3 times a day for 7 days. 21 tablet 0     No current facility-administered medications for this visit.     Dear Ms. Maxwell:     To help you more effectively manage your high blood pressure, we would like to remind you of the following preventive measures you can take at home:    1) Eat right: Your diet should be rich in fruits, vegetables, potassium, and low-fat dairy products. You should also reduce your intake of  sodium and fats, particularly saturated fats.    2) Maintain a healthy weight: Try to achieve and maintain a healthy weight. If you are unsure what this means for you, please contact our clinic.    3) Exercise: Try to get at least 30 minutes of aerobic exercise every day.    4) Moderate your alcohol consumption: Limit your alcohol intake to one drink per day.    5) Monitor your blood pressure: You should check your blood pressure regularly. Notify our clinic if your blood pressure readings are consistently higher than recommended.    We have included a personalized hypertension report card on the following page that lists your most recent blood pressure readings and lab results, along with your ideal values. If you have any questions or concerns about these instructions, your results, or your improving health, please don't hesitate to call.    Thank you for including us as members of your health care team.    [unfilled]    Sincerely,         Amy Del Valle MD    Enclosure      Hypertension Report Card for Clay Maxwell  January 29, 2024:     Below is a summary of recent tests related to your hypertension that can help you manage your health.     Blood Pressure:   Normal blood pressure values are 120/80 or lower. Your blood pressure values should be less than 140/90. If your readings at home are consistently higher than this, please contact me.     Your most recent blood pressure readings at our clinic were:   BP Readings from Last 3 Encounters:   01/29/24 120/70   12/05/23 126/78   05/23/23 122/78       Creatinine:   High blood pressure can cause a loss of kidney function. Creatinine is a measure of this kidney function.      Your most recent creatinine levels were:   Creatinine (mg/dL)   Date Value   11/30/2023 0.58   11/10/2022 0.60   05/27/2021 0.70   10/02/2019 0.65           Potassium:  Potassium is an electrolyte in your blood that can be affected by blood pressure treatment.     Your most recent potassium  levels were:  Potassium (mmol/L)   Date Value   11/30/2023 3.9   11/10/2022 3.8   05/27/2021 4.5   10/02/2019 4.2

## 2024-01-31 ENCOUNTER — HOSPITAL ENCOUNTER (OUTPATIENT)
Dept: RADIOLOGY | Facility: CLINIC | Age: 69
Discharge: HOME | End: 2024-01-31
Payer: MEDICARE

## 2024-01-31 DIAGNOSIS — R21 RASH: ICD-10-CM

## 2024-01-31 DIAGNOSIS — R92.8 ABNORMAL MAMMOGRAM: ICD-10-CM

## 2024-01-31 DIAGNOSIS — R23.4 BREAST SKIN CHANGES: ICD-10-CM

## 2024-01-31 DIAGNOSIS — N64.51 INDURATION OF BREAST: ICD-10-CM

## 2024-02-02 ENCOUNTER — HOSPITAL ENCOUNTER (OUTPATIENT)
Dept: RADIOLOGY | Facility: EXTERNAL LOCATION | Age: 69
Discharge: HOME | End: 2024-02-02

## 2024-02-13 ENCOUNTER — HOSPITAL ENCOUNTER (OUTPATIENT)
Dept: RADIOLOGY | Facility: CLINIC | Age: 69
Discharge: HOME | End: 2024-02-13
Payer: MEDICARE

## 2024-02-13 DIAGNOSIS — M48.07 SPINAL STENOSIS OF LUMBOSACRAL REGION: ICD-10-CM

## 2024-02-13 DIAGNOSIS — E78.2 MIXED HYPERLIPIDEMIA: ICD-10-CM

## 2024-02-13 PROCEDURE — 72148 MRI LUMBAR SPINE W/O DYE: CPT | Performed by: RADIOLOGY

## 2024-02-13 PROCEDURE — 72148 MRI LUMBAR SPINE W/O DYE: CPT

## 2024-02-13 NOTE — RESULT ENCOUNTER NOTE
MRI shows increased compression of the nerves compared to previous.  Would like to know how she is doing.

## 2024-03-08 ENCOUNTER — TELEPHONE (OUTPATIENT)
Dept: PRIMARY CARE | Facility: CLINIC | Age: 69
End: 2024-03-08
Payer: MEDICARE

## 2024-03-08 DIAGNOSIS — E55.9 VITAMIN D DEFICIENCY: ICD-10-CM

## 2024-03-08 DIAGNOSIS — E53.8 VITAMIN B 12 DEFICIENCY: ICD-10-CM

## 2024-03-08 DIAGNOSIS — I10 ESSENTIAL (PRIMARY) HYPERTENSION: ICD-10-CM

## 2024-03-08 DIAGNOSIS — Z00.129 ENCOUNTER FOR ROUTINE CHILD HEALTH EXAMINATION WITHOUT ABNORMAL FINDINGS: ICD-10-CM

## 2024-03-08 DIAGNOSIS — E66.3 OVERWEIGHT WITH BODY MASS INDEX (BMI) 25.0-29.9: ICD-10-CM

## 2024-03-08 NOTE — TELEPHONE ENCOUNTER
Please schedule patient for [facility] wellness visit in Dec 2024. Schedule with Dr Del Valle. Please send this encounter to Dr. Del Valle for lab orders once scheduled.     Thank you-  Carlos Connolly CMA  3/8/2024  Practice Supervisor  [facility]

## 2024-03-13 NOTE — TELEPHONE ENCOUNTER
Pt coming in on 12/04/24 for medicare annual physical. BW? Pt will go to a  facility for blood work

## 2024-10-17 ENCOUNTER — OFFICE VISIT (OUTPATIENT)
Dept: PRIMARY CARE | Facility: CLINIC | Age: 69
End: 2024-10-17
Payer: MEDICARE

## 2024-10-17 ENCOUNTER — HOSPITAL ENCOUNTER (OUTPATIENT)
Dept: RADIOLOGY | Facility: CLINIC | Age: 69
Discharge: HOME | End: 2024-10-17
Payer: MEDICARE

## 2024-10-17 VITALS
SYSTOLIC BLOOD PRESSURE: 124 MMHG | HEART RATE: 68 BPM | BODY MASS INDEX: 26.37 KG/M2 | DIASTOLIC BLOOD PRESSURE: 78 MMHG | WEIGHT: 163.4 LBS

## 2024-10-17 DIAGNOSIS — M25.551 RIGHT HIP PAIN: Primary | ICD-10-CM

## 2024-10-17 DIAGNOSIS — M25.551 RIGHT HIP PAIN: ICD-10-CM

## 2024-10-17 PROCEDURE — 73502 X-RAY EXAM HIP UNI 2-3 VIEWS: CPT | Mod: RT

## 2024-10-17 PROCEDURE — 3074F SYST BP LT 130 MM HG: CPT | Performed by: STUDENT IN AN ORGANIZED HEALTH CARE EDUCATION/TRAINING PROGRAM

## 2024-10-17 PROCEDURE — 1160F RVW MEDS BY RX/DR IN RCRD: CPT | Performed by: STUDENT IN AN ORGANIZED HEALTH CARE EDUCATION/TRAINING PROGRAM

## 2024-10-17 PROCEDURE — 99213 OFFICE O/P EST LOW 20 MIN: CPT | Performed by: STUDENT IN AN ORGANIZED HEALTH CARE EDUCATION/TRAINING PROGRAM

## 2024-10-17 PROCEDURE — 1159F MED LIST DOCD IN RCRD: CPT | Performed by: STUDENT IN AN ORGANIZED HEALTH CARE EDUCATION/TRAINING PROGRAM

## 2024-10-17 PROCEDURE — 3078F DIAST BP <80 MM HG: CPT | Performed by: STUDENT IN AN ORGANIZED HEALTH CARE EDUCATION/TRAINING PROGRAM

## 2024-10-17 RX ORDER — METHYLPREDNISOLONE 4 MG/1
TABLET ORAL
Qty: 21 TABLET | Refills: 0 | Status: SHIPPED | OUTPATIENT
Start: 2024-10-17 | End: 2024-10-24

## 2024-10-17 ASSESSMENT — ENCOUNTER SYMPTOMS
COUGH: 0
SHORTNESS OF BREATH: 0
FATIGUE: 0
FEVER: 0
DIZZINESS: 0
ARTHRALGIAS: 1
LIGHT-HEADEDNESS: 0
HEADACHES: 0
MYALGIAS: 1
ABDOMINAL PAIN: 0
BACK PAIN: 1
CHILLS: 0

## 2024-10-17 NOTE — PROGRESS NOTES
Subjective   Patient ID: Clay Maxwell is a 69 y.o. female who presents for Spinal Stenosis (Right side. X 10 days ).    Spinal Stenosis  Associated symptoms include arthralgias (right hip pain) and myalgias. Pertinent negatives include no abdominal pain, chest pain, chills, coughing, fatigue, fever or headaches.        About 10 days ago she started having issues in the right side of the hip.  The pain is worse with standing.  It is fine in the morning after she wakes up. Then this worsens over the course of the day.  No incident that happened on the right side.  She states she does have some pain in the groin on the right side as well.  No discomfort on the left.    Review of Systems   Constitutional:  Negative for chills, fatigue and fever.   Respiratory:  Negative for cough and shortness of breath.    Cardiovascular:  Negative for chest pain.   Gastrointestinal:  Negative for abdominal pain.   Musculoskeletal:  Positive for arthralgias (right hip pain), back pain (chronic lower back) and myalgias.   Neurological:  Negative for dizziness, light-headedness and headaches.       Objective   /78 (BP Location: Left arm, Patient Position: Sitting)   Pulse 68   Wt 74.1 kg (163 lb 6.4 oz)   BMI 26.37 kg/m²     Physical Exam  Vitals reviewed.   Constitutional:       General: She is not in acute distress.     Appearance: Normal appearance. She is normal weight. She is not ill-appearing or toxic-appearing.   Cardiovascular:      Rate and Rhythm: Normal rate and regular rhythm.      Heart sounds: Normal heart sounds.   Pulmonary:      Effort: Pulmonary effort is normal.      Breath sounds: Normal breath sounds.   Musculoskeletal:         General: Tenderness present. No swelling, deformity or signs of injury. Normal range of motion.      Comments: Pain with internal rotation of the right hip.   Neurological:      Mental Status: She is alert.         Assessment/Plan   Problem List Items Addressed This Visit     None  Visit Diagnoses         Codes    Right hip pain    -  Primary M25.551    Relevant Medications    methylPREDNISolone (Medrol Dospak) 4 mg tablets    Other Relevant Orders    XR hip right with pelvis when performed 2 or 3 views          History of physical examination as above.  Patient with right-sided hip pain.  Physical examination as above suggesting actual hip involvement.  Started on a Medrol Dosepak.  X-rays ordered of the right hip.  We will contact patient back with results.  She is following normally with pain management.  Further follow-up based on results.  She will call with any other questions or concerns.

## 2024-10-17 NOTE — PATIENT INSTRUCTIONS
We can go right downstairs and get the x-ray done of the hip.  Will contact you back with those results.  Did send in a prescription for Medrol Dosepak especially if symptoms worsen upcoming weekend depending on the results.  Based on the findings, that we will determine next steps either with continued follow-up with pain management or or if we need to place a referral to orthopedics.  Thank you

## 2024-12-04 ENCOUNTER — APPOINTMENT (OUTPATIENT)
Dept: PRIMARY CARE | Facility: CLINIC | Age: 69
End: 2024-12-04
Payer: MEDICARE

## 2024-12-05 ENCOUNTER — LAB (OUTPATIENT)
Dept: LAB | Facility: LAB | Age: 69
End: 2024-12-05
Payer: MEDICARE

## 2024-12-05 DIAGNOSIS — E53.8 VITAMIN B 12 DEFICIENCY: ICD-10-CM

## 2024-12-05 DIAGNOSIS — Z00.129 ENCOUNTER FOR ROUTINE CHILD HEALTH EXAMINATION WITHOUT ABNORMAL FINDINGS: ICD-10-CM

## 2024-12-05 DIAGNOSIS — E55.9 VITAMIN D DEFICIENCY: ICD-10-CM

## 2024-12-05 DIAGNOSIS — I10 ESSENTIAL (PRIMARY) HYPERTENSION: ICD-10-CM

## 2024-12-05 LAB
25(OH)D3 SERPL-MCNC: 49 NG/ML (ref 30–100)
ALBUMIN SERPL BCP-MCNC: 4.4 G/DL (ref 3.4–5)
ALP SERPL-CCNC: 67 U/L (ref 33–136)
ALT SERPL W P-5'-P-CCNC: 17 U/L (ref 7–45)
ANION GAP SERPL CALC-SCNC: 14 MMOL/L (ref 10–20)
AST SERPL W P-5'-P-CCNC: 26 U/L (ref 9–39)
BASOPHILS # BLD AUTO: 0.02 X10*3/UL (ref 0–0.1)
BASOPHILS NFR BLD AUTO: 0.4 %
BILIRUB SERPL-MCNC: 0.5 MG/DL (ref 0–1.2)
BUN SERPL-MCNC: 11 MG/DL (ref 6–23)
CALCIUM SERPL-MCNC: 9.4 MG/DL (ref 8.6–10.6)
CHLORIDE SERPL-SCNC: 101 MMOL/L (ref 98–107)
CHOLEST SERPL-MCNC: 204 MG/DL (ref 0–199)
CHOLESTEROL/HDL RATIO: 4.4
CO2 SERPL-SCNC: 28 MMOL/L (ref 21–32)
CREAT SERPL-MCNC: 0.58 MG/DL (ref 0.5–1.05)
EGFRCR SERPLBLD CKD-EPI 2021: >90 ML/MIN/1.73M*2
EOSINOPHIL # BLD AUTO: 0.13 X10*3/UL (ref 0–0.7)
EOSINOPHIL NFR BLD AUTO: 2.4 %
ERYTHROCYTE [DISTWIDTH] IN BLOOD BY AUTOMATED COUNT: 13.8 % (ref 11.5–14.5)
GLUCOSE SERPL-MCNC: 84 MG/DL (ref 74–99)
HCT VFR BLD AUTO: 36.8 % (ref 36–46)
HDLC SERPL-MCNC: 46 MG/DL
HGB BLD-MCNC: 12 G/DL (ref 12–16)
IMM GRANULOCYTES # BLD AUTO: 0.02 X10*3/UL (ref 0–0.7)
IMM GRANULOCYTES NFR BLD AUTO: 0.4 % (ref 0–0.9)
LDLC SERPL CALC-MCNC: 126 MG/DL
LYMPHOCYTES # BLD AUTO: 1.62 X10*3/UL (ref 1.2–4.8)
LYMPHOCYTES NFR BLD AUTO: 30.3 %
MCH RBC QN AUTO: 30.2 PG (ref 26–34)
MCHC RBC AUTO-ENTMCNC: 32.6 G/DL (ref 32–36)
MCV RBC AUTO: 93 FL (ref 80–100)
MONOCYTES # BLD AUTO: 0.7 X10*3/UL (ref 0.1–1)
MONOCYTES NFR BLD AUTO: 13.1 %
NEUTROPHILS # BLD AUTO: 2.85 X10*3/UL (ref 1.2–7.7)
NEUTROPHILS NFR BLD AUTO: 53.4 %
NON HDL CHOLESTEROL: 158 MG/DL (ref 0–149)
NRBC BLD-RTO: 0 /100 WBCS (ref 0–0)
PLATELET # BLD AUTO: 230 X10*3/UL (ref 150–450)
POTASSIUM SERPL-SCNC: 3.7 MMOL/L (ref 3.5–5.3)
PROT SERPL-MCNC: 7.1 G/DL (ref 6.4–8.2)
RBC # BLD AUTO: 3.97 X10*6/UL (ref 4–5.2)
SODIUM SERPL-SCNC: 139 MMOL/L (ref 136–145)
TRIGL SERPL-MCNC: 159 MG/DL (ref 0–149)
TSH SERPL-ACNC: 2.43 MIU/L (ref 0.44–3.98)
VIT B12 SERPL-MCNC: 500 PG/ML (ref 211–911)
VLDL: 32 MG/DL (ref 0–40)
WBC # BLD AUTO: 5.3 X10*3/UL (ref 4.4–11.3)

## 2024-12-05 PROCEDURE — 82607 VITAMIN B-12: CPT

## 2024-12-05 PROCEDURE — 85025 COMPLETE CBC W/AUTO DIFF WBC: CPT

## 2024-12-05 PROCEDURE — 84443 ASSAY THYROID STIM HORMONE: CPT

## 2024-12-05 PROCEDURE — 82306 VITAMIN D 25 HYDROXY: CPT

## 2024-12-05 PROCEDURE — 36415 COLL VENOUS BLD VENIPUNCTURE: CPT

## 2024-12-05 PROCEDURE — 80061 LIPID PANEL: CPT

## 2024-12-05 PROCEDURE — 80053 COMPREHEN METABOLIC PANEL: CPT

## 2024-12-05 NOTE — RESULT ENCOUNTER NOTE
Cholesterol is slightly elevated.  Other labs are within normal range.  This can be discussed at her upcoming Medicare wellness exam

## 2024-12-10 ENCOUNTER — APPOINTMENT (OUTPATIENT)
Dept: PRIMARY CARE | Facility: CLINIC | Age: 69
End: 2024-12-10
Payer: MEDICARE

## 2024-12-10 VITALS
HEIGHT: 66 IN | TEMPERATURE: 97.6 F | OXYGEN SATURATION: 98 % | WEIGHT: 163 LBS | SYSTOLIC BLOOD PRESSURE: 127 MMHG | HEART RATE: 67 BPM | BODY MASS INDEX: 26.2 KG/M2 | DIASTOLIC BLOOD PRESSURE: 82 MMHG

## 2024-12-10 DIAGNOSIS — E78.2 MIXED HYPERLIPIDEMIA: ICD-10-CM

## 2024-12-10 DIAGNOSIS — Z00.00 ROUTINE GENERAL MEDICAL EXAMINATION AT HEALTH CARE FACILITY: ICD-10-CM

## 2024-12-10 DIAGNOSIS — Z00.00 MEDICARE ANNUAL WELLNESS VISIT, SUBSEQUENT: Primary | ICD-10-CM

## 2024-12-10 DIAGNOSIS — Z23 NEED FOR INFLUENZA VACCINATION: ICD-10-CM

## 2024-12-10 PROCEDURE — 3008F BODY MASS INDEX DOCD: CPT | Performed by: NURSE PRACTITIONER

## 2024-12-10 PROCEDURE — 99497 ADVNCD CARE PLAN 30 MIN: CPT | Performed by: NURSE PRACTITIONER

## 2024-12-10 PROCEDURE — 1036F TOBACCO NON-USER: CPT | Performed by: NURSE PRACTITIONER

## 2024-12-10 PROCEDURE — 90662 IIV NO PRSV INCREASED AG IM: CPT | Performed by: NURSE PRACTITIONER

## 2024-12-10 PROCEDURE — G0446 INTENS BEHAVE THER CARDIO DX: HCPCS | Performed by: NURSE PRACTITIONER

## 2024-12-10 PROCEDURE — 1160F RVW MEDS BY RX/DR IN RCRD: CPT | Performed by: NURSE PRACTITIONER

## 2024-12-10 PROCEDURE — 1159F MED LIST DOCD IN RCRD: CPT | Performed by: NURSE PRACTITIONER

## 2024-12-10 PROCEDURE — G0439 PPPS, SUBSEQ VISIT: HCPCS | Performed by: NURSE PRACTITIONER

## 2024-12-10 PROCEDURE — 3079F DIAST BP 80-89 MM HG: CPT | Performed by: NURSE PRACTITIONER

## 2024-12-10 PROCEDURE — G0008 ADMIN INFLUENZA VIRUS VAC: HCPCS | Performed by: NURSE PRACTITIONER

## 2024-12-10 PROCEDURE — 3074F SYST BP LT 130 MM HG: CPT | Performed by: NURSE PRACTITIONER

## 2024-12-10 PROCEDURE — 1170F FXNL STATUS ASSESSED: CPT | Performed by: NURSE PRACTITIONER

## 2024-12-10 ASSESSMENT — ACTIVITIES OF DAILY LIVING (ADL)
GROCERY_SHOPPING: INDEPENDENT
TAKING_MEDICATION: INDEPENDENT
DRESSING: INDEPENDENT
MANAGING_FINANCES: INDEPENDENT
BATHING: INDEPENDENT
DOING_HOUSEWORK: INDEPENDENT

## 2024-12-10 ASSESSMENT — PATIENT HEALTH QUESTIONNAIRE - PHQ9
2. FEELING DOWN, DEPRESSED OR HOPELESS: NOT AT ALL
10. IF YOU CHECKED OFF ANY PROBLEMS, HOW DIFFICULT HAVE THESE PROBLEMS MADE IT FOR YOU TO DO YOUR WORK, TAKE CARE OF THINGS AT HOME, OR GET ALONG WITH OTHER PEOPLE: NOT DIFFICULT AT ALL
SUM OF ALL RESPONSES TO PHQ9 QUESTIONS 1 AND 2: 0
1. LITTLE INTEREST OR PLEASURE IN DOING THINGS: NOT AT ALL

## 2024-12-10 ASSESSMENT — COLUMBIA-SUICIDE SEVERITY RATING SCALE - C-SSRS
6. HAVE YOU EVER DONE ANYTHING, STARTED TO DO ANYTHING, OR PREPARED TO DO ANYTHING TO END YOUR LIFE?: NO
2. HAVE YOU ACTUALLY HAD ANY THOUGHTS OF KILLING YOURSELF?: NO

## 2024-12-10 ASSESSMENT — ENCOUNTER SYMPTOMS
DEPRESSION: 0
LOSS OF SENSATION IN FEET: 0
OCCASIONAL FEELINGS OF UNSTEADINESS: 0

## 2024-12-10 NOTE — PROGRESS NOTES
Subjective   Reason for Visit: Margaret Maxwell is an 69 y.o. female here for a Medicare Wellness visit.     Past Medical, Surgical, and Family History reviewed and updated in chart.    Reviewed all medications by prescribing practitioner or clinical pharmacist (such as prescriptions, OTCs, herbal therapies and supplements) and documented in the medical record.    HPI    Patient Care Team:  Amy Del Valle MD as PCP - General  Amy Del Val[facility]  Sherry Randolph MD as Referring Physician (Dermatology)  Jerson Mccoy MD as Referring Physician (Obstetrics and Gynecology)  Yobani Saxena DO as Referring Physician (Pain Medicine)     PHQ-2/9 score: 0  STEADI Fall Risk: Low falls risk  Home and Safety Risk Factors: None  Functional Ability/Level of Safety:   Cognitive Impairment Observed: No cognitive impairment observed  Cognitive Impairment Reported: Cognitive impairment reported by patient or family  General Health:    Patient Self Assessment: Good  Nutrition and Exercise:    Current Diet: Well Balanced Diet   Adequate Fluid Intake: Yes   Caffeine: Yes   Exercise Frequency: Regularly  Activities of Daily Living Screening:    Hearing - Right Ear: Functional   Hearing - Left Ear: Functional   Bathing: Independent   Dressing: Independent   Walks in Home: Independent  Instrumental Activities of Daily Living Screening:    Managing Finances: Independent   Grocery Shopping: Independent   Taking Medication: Independent   Doing Housework: Independent      Review of Systems  Gen: denies fever, chills, weight loss, fatigue  HEENT: denies sinus pressure, sinus congestion, runny nose, red eyes, itchy eyes, vision loss, ear pain, hearing loss, throat pain, trouble swallowing  Neck: denies neck pain, neck swelling or masses  Chest/breast: denies breast pain, breast lumps, nipple discharge  CV: denies chest pain, palpitations, fast heart rate, syncope  Resp: denies shortness of  "breath, cough, wheezing  GI: denies abdominal pain, nausea, diarrhea, constipation, hematochezia, melena  : denies dysuria, hematuria, vaginal discharge, frequency  Endo: denies polydipsia, polyuria, heat/cold intolerance, weight change, hair thinning  Heme: denies easy bruising, easy bleeding  Neuro: denies headache, numbness, tingling, memory loss, changes in vision  MSK: chronic low back pain. denies joint pain, joint swelling, weakness  Psych: denies suicidal ideation, homicidal ideation, depression, anxiety, mood swings  Skin: denies rashes, abnormal lesions, itching, changes in moles    Objective   Vitals:  /82   Pulse 67   Temp 36.4 °C (97.6 °F)   Ht 1.676 m (5' 6\")   Wt 73.9 kg (163 lb)   SpO2 98%   BMI 26.31 kg/m²       Social Drivers of Health     Tobacco Use: Low Risk  (12/10/2024)    Patient History     Smoking Tobacco Use: Never     Smokeless Tobacco Use: Never     Passive Exposure: Never   Alcohol Use: Not on file   Financial Resource Strain: Not on file   Food Insecurity: Not on file   Transportation Needs: Not on file   Physical Activity: Not on file   Stress: Not on file   Social Connections: Not on file   Intimate Partner Violence: Not on file   Depression: Not at risk (12/10/2024)    PHQ-2     PHQ-2 Score: 0   Housing Stability: Not on file   Utilities: Not on file   Digital Equity: Not on file   Health Literacy: Not on file        Chemistry    Lab Results   Component Value Date/Time     12/05/2024 1149    K 3.7 12/05/2024 1149     12/05/2024 1149    CO2 28 12/05/2024 1149    BUN 11 12/05/2024 1149    CREATININE 0.58 12/05/2024 1149    Lab Results   Component Value Date/Time    CALCIUM 9.4 12/05/2024 1149    ALKPHOS 67 12/05/2024 1149    AST 26 12/05/2024 1149    ALT 17 12/05/2024 1149    BILITOT 0.5 12/05/2024 1149         Lab Results   Component Value Date    CHOL 204 (H) 12/05/2024    CHOL 175 11/30/2023    CHOL 194 11/10/2022     Lab Results   Component Value Date    " "HDL 46.0 12/05/2024    HDL 42.7 11/30/2023    HDL 48.8 11/10/2022     Lab Results   Component Value Date    LDLCALC 126 (H) 12/05/2024    LDLCALC 98 11/30/2023     Lab Results   Component Value Date    TRIG 159 (H) 12/05/2024    TRIG 172 (H) 11/30/2023    TRIG 121 11/10/2022     No components found for: \"CHOLHDL\"     Lab Results   Component Value Date    TSH 2.43 12/05/2024      Lab Results   Component Value Date    WBC 5.3 12/05/2024    HGB 12.0 12/05/2024    HCT 36.8 12/05/2024    MCV 93 12/05/2024     12/05/2024       Physical Exam  No physical exam was completed today.     Assessment & Plan  Medicare annual wellness visit, subsequent  - Recommend Tdap, COVID, and RSV vaccines at the pharmacy  - Mammogram done 3/13/2024  - DEXA scan done 4/18/2024  - Colonoscopy done 12/2/2024  - Advance Directives Discussion: 16 - 20 minutes were spent discussing Advanced Care Planning (including a Living Will, Medical Power Of , as well as specific end of life choices and/or directives). The details of that discussion were documented in Advanced Directives Discussion section of the medical record.   - Cardiac Risk Assessment: 15 - 20 minutes were spent discussing Cardiovascular risk and, if needed, lifestyle modifications were recommended, including nutritional choices, exercise, and elimination of habits contributing to risk  The 10-year ASCVD risk score (Megan DK, et al., 2019) is: 11.7%    Values used to calculate the score:      Age: 69 years      Sex: Female      Is Non- : No      Diabetic: No      Tobacco smoker: No      Systolic Blood Pressure: 127 mmHg      Is BP treated: Yes      HDL Cholesterol: 46 mg/dL      Total Cholesterol: 204 mg/dL   -Orders:    CT cardiac scoring wo IV contrast; Future  - MoCA score: 26/30 (scanned into chart)    [facility]            "

## 2024-12-10 NOTE — PROGRESS NOTES
"Subjective   Reason for Visit: Margaret Maxwell is an 69 y.o. female here for a Medicare Wellness visit.     Past Medical, Surgical, and Family History reviewed and updated in chart.    Reviewed all medications by prescribing practitioner or clinical pharmacist (such as prescriptions, OTCs, herbal therapies and supplements) and documented in the medical record.    HPI    Patient Care Team:  Amy Del Valle MD as PCP - General  A[facility] Del [facility]  Sherry Randolph MD as Referring Physician (Dermatology)  Jerson Mccoy MD as Referring Physician (Obstetrics and Gynecology)  Yobani Saxena DO as Referring Physician (Pain Medicine)     Review of Systems    Objective   Vitals:  /82   Pulse 67   Temp 36.4 °C (97.6 °F)   Ht 1.676 m (5' 6\")   Wt 73.9 kg (163 lb)   SpO2 98%   BMI 26.31 kg/m²       Physical Exam    Assessment & Plan  Medicare annual wellness visit, subsequent         Need for influenza vaccination    Orders:  •  Flu vaccine, trivalent, preservative free, HIGH-DOSE, age 65y+ (Fluzone)    Mixed hyperlipidemia    Orders:  •  CT cardiac scoring wo IV contrast; Future    Routine general medical examination at health care facility    Orders:  •  1 Year Follow Up In [facility] - PCP - Wellness Exam; Future              "

## 2024-12-10 NOTE — PATIENT INSTRUCTIONS
I ordered a CT cardiac score. To schedule, call [phone]      Vaccines you are due for: RSV, Tdap, and COVID

## 2024-12-10 NOTE — ACP (ADVANCE CARE PLANNING)
Confirming Previous Code Status:   Advance Care Planning Note     Discussion Date: 12/10/24   Discussion Participants: patient    The patient wishes to discuss Advance Care Planning today and the following is a brief summary of our discussion.     Patient has capacity to make their own medical decisions: Yes  Health Care Agent/Surrogate Decision Maker documented in chart: Yes,  Zack Maxwell     Documents on file and valid:  Advance Directive/Living Will: Yes   Health Care Power of : Yes  Other: Patient will bring in copies next week    Communication of Medical Status/Prognosis:   Good     Communication of Treatment Goals/Options:   Good     Treatment Decisions  Goals of Care: survival is prioritized, if goals for quality or survival can reasonably be achieved     Follow Up Plan  Bring in ACP documents next week  Team Members  Amy Del Valle MD as PCP - General  Amy Del Valle [facility]  Sherry Randolph MD as Referring Physician (Dermatology)  Jerson Mccoy MD as Referring Physician (Obstetrics and Gynecology)  Yobani Saxena DO as Referring Physician (Pain Medicin  Time Statement: Total face to face time spent on advance care planning was 16 minutes with 6 minutes spent in counseling, including the explanation.    Rossy Multani, TOMEKA-CNP  12/10/2024 8:50 AM

## 2024-12-16 ENCOUNTER — APPOINTMENT (OUTPATIENT)
Dept: PRIMARY CARE | Facility: CLINIC | Age: 69
End: 2024-12-16
Payer: MEDICARE

## 2024-12-16 VITALS
SYSTOLIC BLOOD PRESSURE: 132 MMHG | WEIGHT: 163.8 LBS | BODY MASS INDEX: 26.44 KG/M2 | DIASTOLIC BLOOD PRESSURE: 80 MMHG | OXYGEN SATURATION: 98 % | HEART RATE: 72 BPM | TEMPERATURE: 96.9 F

## 2024-12-16 DIAGNOSIS — M43.16 SPONDYLOLISTHESIS AT L4-L5 LEVEL: Primary | ICD-10-CM

## 2024-12-16 DIAGNOSIS — E78.2 MIXED HYPERLIPIDEMIA: ICD-10-CM

## 2024-12-16 DIAGNOSIS — N30.01 ACUTE CYSTITIS WITH HEMATURIA: ICD-10-CM

## 2024-12-16 DIAGNOSIS — I10 ESSENTIAL (PRIMARY) HYPERTENSION: ICD-10-CM

## 2024-12-16 DIAGNOSIS — M48.07 SPINAL STENOSIS OF LUMBOSACRAL REGION: ICD-10-CM

## 2024-12-16 LAB
POC APPEARANCE, URINE: CLEAR
POC BILIRUBIN, URINE: NEGATIVE
POC BLOOD, URINE: ABNORMAL
POC COLOR, URINE: YELLOW
POC GLUCOSE, URINE: NEGATIVE MG/DL
POC KETONES, URINE: ABNORMAL MG/DL
POC LEUKOCYTES, URINE: NEGATIVE
POC NITRITE,URINE: NEGATIVE
POC PH, URINE: 5.5 PH
POC PROTEIN, URINE: NEGATIVE MG/DL
POC SPECIFIC GRAVITY, URINE: 1.01
POC UROBILINOGEN, URINE: 0.2 EU/DL

## 2024-12-16 PROCEDURE — 99214 OFFICE O/P EST MOD 30 MIN: CPT | Performed by: FAMILY MEDICINE

## 2024-12-16 PROCEDURE — 1159F MED LIST DOCD IN RCRD: CPT | Performed by: FAMILY MEDICINE

## 2024-12-16 PROCEDURE — 1036F TOBACCO NON-USER: CPT | Performed by: FAMILY MEDICINE

## 2024-12-16 PROCEDURE — 81003 URINALYSIS AUTO W/O SCOPE: CPT | Performed by: FAMILY MEDICINE

## 2024-12-16 PROCEDURE — 1160F RVW MEDS BY RX/DR IN RCRD: CPT | Performed by: FAMILY MEDICINE

## 2024-12-16 PROCEDURE — 3079F DIAST BP 80-89 MM HG: CPT | Performed by: FAMILY MEDICINE

## 2024-12-16 PROCEDURE — 87086 URINE CULTURE/COLONY COUNT: CPT

## 2024-12-16 PROCEDURE — G2211 COMPLEX E/M VISIT ADD ON: HCPCS | Performed by: FAMILY MEDICINE

## 2024-12-16 PROCEDURE — 3075F SYST BP GE 130 - 139MM HG: CPT | Performed by: FAMILY MEDICINE

## 2024-12-16 RX ORDER — LISINOPRIL 40 MG/1
40 TABLET ORAL DAILY
Qty: 90 TABLET | Refills: 0 | Status: SHIPPED | OUTPATIENT
Start: 2024-12-16

## 2024-12-16 ASSESSMENT — ENCOUNTER SYMPTOMS
JOINT SWELLING: 0
PALPITATIONS: 0
POLYDIPSIA: 0
LOSS OF SENSATION IN FEET: 0
OCCASIONAL FEELINGS OF UNSTEADINESS: 0
SORE THROAT: 0
FACIAL SWELLING: 0
VOICE CHANGE: 0
NAUSEA: 0
WHEEZING: 0
FEVER: 0
FATIGUE: 0
AGITATION: 0
ABDOMINAL PAIN: 0
SHORTNESS OF BREATH: 0
EYE PAIN: 0
VOMITING: 0
CONSTIPATION: 0
APPETITE CHANGE: 0
BLOOD IN STOOL: 0
DIZZINESS: 0
POLYPHAGIA: 0
EYE ITCHING: 0
COUGH: 0
RHINORRHEA: 0
ACTIVITY CHANGE: 0
DYSURIA: 0
CHEST TIGHTNESS: 0
MYALGIAS: 0
FREQUENCY: 0
EYE REDNESS: 0
SINUS PAIN: 0
CHILLS: 0
DIARRHEA: 0
UNEXPECTED WEIGHT CHANGE: 0
TROUBLE SWALLOWING: 0
BRUISES/BLEEDS EASILY: 0
DIAPHORESIS: 0
ARTHRALGIAS: 0
COLOR CHANGE: 0
SINUS PRESSURE: 0
NECK STIFFNESS: 0
WOUND: 0
SLEEP DISTURBANCE: 0
DEPRESSION: 0
ADENOPATHY: 0
BACK PAIN: 0
EYE DISCHARGE: 0
FLANK PAIN: 0
HEMATURIA: 0
NERVOUS/ANXIOUS: 0

## 2024-12-16 NOTE — PROGRESS NOTES
"Subjective   Patient ID: Clay Maxwell is a 69 y.o. female who presents for Sciatica (Started in October. Seen Dr Jansen on 10/17/2024. Was put on prednisone and that helped. No pain today).  Today she is accompanied by alone.     Neuropathy      Back Pain  This is a chronic problem. The current episode started more than 1 year ago. The problem occurs daily. The problem has been waxing and waning since onset. The pain is present in the sacro-iliac. The quality of the pain is described as aching and stabbing. The pain radiates to the left thigh. The pain is at a severity of 4/10. The pain is Worse during the day. The symptoms are aggravated by bending and twisting. Stiffness is present In the morning. Associated symptoms include leg pain. Pertinent negatives include no abdominal pain, bladder incontinence, bowel incontinence, chest pain, dysuria, fever, headaches, numbness, paresis, paresthesias, pelvic pain, perianal numbness, tingling, weakness or weight loss. Risk factors include menopause.   Took steroids with some relief. Has been doing HEP  Nsaids daily    Subjective:   Margaret Maxwell \"Clay\" is a 69 y.o. female with hypertension.  Current Outpatient Medications   Medication Sig Dispense Refill    acetaminophen (Tylenol) 500 mg tablet Take 1 tablet (500 mg) by mouth twice a day.      biotin 10,000 mcg capsule Take 1 capsule (10 mg) by mouth once daily.      cholecalciferol (Vitamin D-3) 25 MCG (1000 UT) capsule Take 1 capsule (25 mcg) by mouth once daily.      clobetasol (Temovate) 0.05 % cream Apply 1 Application topically twice a day.      multivitamin tablet Take 1 tablet by mouth once daily.      sulfacetamide sodium-sulfur 9.8-4.8 % cleanser APPLY INCH  apply to face daily      lisinopril 20 mg tablet Take 1 tablet (20 mg) by mouth once daily. 90 tablet 0     No current facility-administered medications for this visit.      Hypertension ROS: taking medications as instructed, no medication side effects " noted, home BP monitoring in range of 116's systolic over 70's diastolic, no TIA's, no chest pain on exertion, no dyspnea on exertion, no swelling of ankles, no orthostatic dizziness or lightheadedness, no orthopnea or paroxysmal nocturnal dyspnea, and no palpitations.   New concerns: none.     Objective:   /80   Pulse 72   Temp 36.1 °C (96.9 °F) (Oral)   Wt 74.3 kg (163 lb 12.8 oz)   SpO2 98%   BMI 26.44 kg/m²      Review of Systems   Constitutional:  Negative for activity change, appetite change, chills, diaphoresis, fatigue, fever and unexpected weight change.   HENT:  Negative for congestion, dental problem, drooling, ear discharge, ear pain, facial swelling, hearing loss, nosebleeds, postnasal drip, rhinorrhea, sinus pressure, sinus pain, sneezing, sore throat, tinnitus, trouble swallowing and voice change.    Eyes:  Negative for pain, discharge, redness, itching and visual disturbance.   Respiratory:  Negative for cough, chest tightness, shortness of breath and wheezing.    Cardiovascular:  Negative for chest pain, palpitations and leg swelling.   Gastrointestinal:  Negative for abdominal pain, blood in stool, constipation, diarrhea, nausea and vomiting.   Endocrine: Negative for cold intolerance, heat intolerance, polydipsia, polyphagia and polyuria.   Genitourinary:  Negative for decreased urine volume, dysuria, flank pain, frequency, hematuria and urgency.   Musculoskeletal:  Negative for arthralgias, back pain, gait problem, joint swelling, myalgias and neck stiffness.   Skin:  Negative for color change, pallor, rash and wound.   Neurological:  Negative for dizziness.   Hematological:  Negative for adenopathy. Does not bruise/bleed easily.   Psychiatric/Behavioral:  Negative for agitation, behavioral problems and sleep disturbance. The patient is not nervous/anxious.        Objective   /80   Pulse 72   Temp 36.1 °C (96.9 °F) (Oral)   Wt 74.3 kg (163 lb 12.8 oz)   SpO2 98%   BMI 26.44  kg/m²     Physical Exam  Vitals and nursing note reviewed.   Constitutional:       General: She is not in acute distress.     Appearance: Normal appearance. She is not ill-appearing, toxic-appearing or diaphoretic.   HENT:      Head: Normocephalic and atraumatic.      Right Ear: Tympanic membrane, ear canal and external ear normal. There is no impacted cerumen.      Left Ear: Tympanic membrane, ear canal and external ear normal. There is no impacted cerumen.      Nose: No congestion or rhinorrhea.      Mouth/Throat:      Mouth: Mucous membranes are moist.      Pharynx: Oropharynx is clear. No oropharyngeal exudate or posterior oropharyngeal erythema.   Eyes:      General: No scleral icterus.        Right eye: No discharge.         Left eye: No discharge.      Extraocular Movements: Extraocular movements intact.      Conjunctiva/sclera: Conjunctivae normal.      Pupils: Pupils are equal, round, and reactive to light.   Neck:      Vascular: No carotid bruit.   Cardiovascular:      Rate and Rhythm: Normal rate and regular rhythm.      Pulses: Normal pulses.      Heart sounds: Normal heart sounds. No murmur heard.     No friction rub. No gallop.   Pulmonary:      Effort: Pulmonary effort is normal. No respiratory distress.      Breath sounds: Normal breath sounds. No stridor. No wheezing, rhonchi or rales.   Chest:      Chest wall: No tenderness.   Musculoskeletal:         General: Normal range of motion.      Cervical back: Normal range of motion and neck supple. No rigidity or tenderness.      Right lower leg: No edema.      Left lower leg: No edema.   Lymphadenopathy:      Cervical: No cervical adenopathy.   Skin:     General: Skin is warm and dry.   Neurological:      General: No focal deficit present.      Mental Status: She is alert and oriented to person, place, and time.   Psychiatric:         Mood and Affect: Mood normal.         Behavior: Behavior normal.         Thought Content: Thought content normal.          Judgment: Judgment normal.         Assessment/Plan   Problem List Items Addressed This Visit             ICD-10-CM    Spinal stenosis of lumbosacral region M48.07     Assessment/Plan   Spinal stenosis.  Natural history and expected course discussed. Questions answered.  Proper lifting, bending technique discussed.  Stretching exercises discussed.  Regular aerobic and trunk strengthening exercises discussed.  MRI of the affected area discussed.  OTC analgesics as needed.  Follow-up in  as needed if develops pain.  .         Mixed hyperlipidemia - Primary E78.2     you have high cholesterol. (hyperlipidemia). This increases your risk for cardiovascular disease.(Heart attack/stroke/circulation);  Continue any prescribed and advised OTC medications, in addition, as reminder:;   For high LDL (>130) use blueberries 1 cup daily;   Plant Stanol dose 950mg twice daily (may need to find online source for this dose), and ;    Metamucil/psyllium fiber 7 grams daily.;  ;  For high Triglycerides:;   Fish oil (start at 2000-3000mg daily);  ;   You should exercise 30 minutes daily. ;  ;   Your nutrition plan needs to emphasizes vegetables, fruits, and lean meat. I recommend the Mediterranean Diet ;  Please find this link to helpful information about lowering your cholesterol: http://www.aafp.org/afp/2010/0501/p1103.html;           Spondylolisthesis at L4-L5 level M43.16     Other Visit Diagnoses         Codes    Essential (primary) hypertension     I10    Relevant Medications    lisinopril 40 mg tablet    Acute cystitis with hematuria     N30.01    Relevant Orders    POCT UA Automated manually resulted    Urine Culture          Current Outpatient Medications   Medication Sig Dispense Refill    acetaminophen (Tylenol) 500 mg tablet Take 1 tablet (500 mg) by mouth twice a day.      biotin 10,000 mcg capsule Take 1 capsule (10 mg) by mouth once daily.      cholecalciferol (Vitamin D-3) 25 MCG (1000 UT) capsule Take 1 capsule (25 mcg)  by mouth once daily.      clobetasol (Temovate) 0.05 % cream Apply 1 Application topically twice a day.      multivitamin tablet Take 1 tablet by mouth once daily.      sulfacetamide sodium-sulfur 9.8-4.8 % cleanser APPLY INCH  apply to face daily      lisinopril 40 mg tablet Take 1 tablet (40 mg) by mouth once daily. 90 tablet 0     No current facility-administered medications for this visit.     F/U in 3 months for HTN   Continue current medications  Call if any new concerns  Ok to schedule CT Ca scoring  F/U with the nurse in 1month for BP check  Send in your readings by jean-claude    Dear Ms. Maxwell:     To help you more effectively manage your high blood pressure, we would like to remind you of the following preventive measures you can take at home:    1) Eat right: Your diet should be rich in fruits, vegetables, potassium, and low-fat dairy products. You should also reduce your intake of sodium and fats, particularly saturated fats.    2) Maintain a healthy weight: Try to achieve and maintain a healthy weight. If you are unsure what this means for you, please contact our clinic.    3) Exercise: Try to get at least 30 minutes of aerobic exercise every day.    4) Moderate your alcohol consumption: Limit your alcohol intake to one drink per day.    5) Monitor your blood pressure: You should check your blood pressure regularly. Notify our clinic if your blood pressure readings are consistently higher than recommended.    We have included a personalized hypertension report card on the following page that lists your most recent blood pressure readings and lab results, along with your ideal values. If you have any questions or concerns about these instructions, your results, or your improving health, please don't hesitate to call.    Thank you for including us as members of your health care team.    [unfilled]    Sincerely,         Amy Del Valle MD    Enclosure      Hypertension Report Card for Clay  Alissa  December 16, 2024:     Below is a summary of recent tests related to your hypertension that can help you manage your health.     Blood Pressure:   Normal blood pressure values are 120/80 or lower. Your blood pressure values should be less than 140/90. If your readings at home are consistently higher than this, please contact me.     Your most recent blood pressure readings at our clinic were:   BP Readings from Last 3 Encounters:   12/16/24 132/80   12/10/24 127/82   10/17/24 124/78       Creatinine:   High blood pressure can cause a loss of kidney function. Creatinine is a measure of this kidney function.      Your most recent creatinine levels were:   Creatinine (mg/dL)   Date Value   12/05/2024 0.58   11/30/2023 0.58   11/10/2022 0.60   05/27/2021 0.70   10/02/2019 0.65           Potassium:  Potassium is an electrolyte in your blood that can be affected by blood pressure treatment.     Your most recent potassium levels were:  Potassium (mmol/L)   Date Value   12/05/2024 3.7   11/30/2023 3.9   11/10/2022 3.8   05/27/2021 4.5   10/02/2019 4.2

## 2024-12-16 NOTE — PATIENT INSTRUCTIONS
F/U in 3 months for HTN   Continue current medications  Call if any new concerns  Ok to schedule CT Ca scoring  F/U with the nurse in 1month for BP check  Send in your readings by jean-claude    Current Outpatient Medications   Medication Sig Dispense Refill    acetaminophen (Tylenol) 500 mg tablet Take 1 tablet (500 mg) by mouth twice a day.      biotin 10,000 mcg capsule Take 1 capsule (10 mg) by mouth once daily.      cholecalciferol (Vitamin D-3) 25 MCG (1000 UT) capsule Take 1 capsule (25 mcg) by mouth once daily.      clobetasol (Temovate) 0.05 % cream Apply 1 Application topically twice a day.      multivitamin tablet Take 1 tablet by mouth once daily.      sulfacetamide sodium-sulfur 9.8-4.8 % cleanser APPLY INCH  apply to face daily      lisinopril 40 mg tablet Take 1 tablet (40 mg) by mouth once daily. 90 tablet 0     No current facility-administered medications for this visit.

## 2024-12-16 NOTE — ASSESSMENT & PLAN NOTE
Hypertension Assessment  No primary diagnosis found.  Discussion: stage 1  Cardiovascular risk factors: advanced age (older than 55 for men, 65 for women) and family history of premature cardiovascular disease    Hypertension Plan  Medication changes per orders.  Dietary sodium restriction.  Regular aerobic exercise.  Follow up in 1 month.  Patient Education: Reviewed risks of hypertension and principles of   treatment.  Counseling time: counseling time more than 50% of visit: 45 minutes.

## 2024-12-18 LAB — BACTERIA UR CULT: NO GROWTH

## 2024-12-20 ENCOUNTER — OFFICE VISIT (OUTPATIENT)
Dept: PRIMARY CARE | Facility: CLINIC | Age: 69
End: 2024-12-20
Payer: MEDICARE

## 2024-12-20 VITALS
DIASTOLIC BLOOD PRESSURE: 72 MMHG | TEMPERATURE: 98.4 F | WEIGHT: 165.8 LBS | BODY MASS INDEX: 26.76 KG/M2 | HEART RATE: 68 BPM | SYSTOLIC BLOOD PRESSURE: 118 MMHG

## 2024-12-20 DIAGNOSIS — N39.0 ACUTE UTI: Primary | ICD-10-CM

## 2024-12-20 LAB
POC BILIRUBIN, URINE: NEGATIVE
POC BLOOD, URINE: ABNORMAL
POC GLUCOSE, URINE: NEGATIVE MG/DL
POC KETONES, URINE: NEGATIVE MG/DL
POC LEUKOCYTES, URINE: ABNORMAL
POC NITRITE,URINE: POSITIVE
POC PH, URINE: 6 PH
POC PROTEIN, URINE: ABNORMAL MG/DL
POC SPECIFIC GRAVITY, URINE: >=1.03
POC UROBILINOGEN, URINE: 1 EU/DL

## 2024-12-20 PROCEDURE — 87186 SC STD MICRODIL/AGAR DIL: CPT

## 2024-12-20 PROCEDURE — 1159F MED LIST DOCD IN RCRD: CPT | Performed by: FAMILY MEDICINE

## 2024-12-20 PROCEDURE — 99214 OFFICE O/P EST MOD 30 MIN: CPT | Performed by: FAMILY MEDICINE

## 2024-12-20 PROCEDURE — 81003 URINALYSIS AUTO W/O SCOPE: CPT | Performed by: FAMILY MEDICINE

## 2024-12-20 PROCEDURE — 3078F DIAST BP <80 MM HG: CPT | Performed by: FAMILY MEDICINE

## 2024-12-20 PROCEDURE — 1036F TOBACCO NON-USER: CPT | Performed by: FAMILY MEDICINE

## 2024-12-20 PROCEDURE — 3074F SYST BP LT 130 MM HG: CPT | Performed by: FAMILY MEDICINE

## 2024-12-20 PROCEDURE — 87086 URINE CULTURE/COLONY COUNT: CPT

## 2024-12-20 RX ORDER — CIPROFLOXACIN 500 MG/1
500 TABLET ORAL 2 TIMES DAILY
Qty: 14 TABLET | Refills: 0 | Status: SHIPPED | OUTPATIENT
Start: 2024-12-20 | End: 2024-12-27

## 2024-12-20 ASSESSMENT — ENCOUNTER SYMPTOMS
PALPITATIONS: 0
DIZZINESS: 0
DIFFICULTY URINATING: 0
CHEST TIGHTNESS: 0
FREQUENCY: 1
COLOR CHANGE: 0
COUGH: 0
APPETITE CHANGE: 0
FLANK PAIN: 0
BACK PAIN: 0
ARTHRALGIAS: 0
FEVER: 0
CHOKING: 0
FATIGUE: 0
DYSURIA: 1
ACTIVITY CHANGE: 0

## 2024-12-20 NOTE — PROGRESS NOTES
"Subjective   Patient ID: Margaret Maxwell \"Clay\" is a 69 y.o. female who presents for Re-current uti (Urinary burning, urgency, frequency. X 6 days /Finished Macrobid Wednesday. ).    HPI   Patient reports that she just finished UTI reports frequency and urgency for the past 6 days reports she finished Macrobid on Wednesday.  She has gotten these on and off for several years about one yearly. She had a period of time in [id number] she had like 7 UTI's.  At that time they were cultured and antibiotics were appropriately given.     Review of Systems   Constitutional:  Negative for activity change, appetite change, fatigue and fever.   HENT:  Negative for congestion.    Respiratory:  Negative for cough, choking and chest tightness.    Cardiovascular:  Negative for chest pain, palpitations and leg swelling.   Genitourinary:  Positive for decreased urine volume, dysuria, frequency and urgency. Negative for difficulty urinating and flank pain.   Musculoskeletal:  Negative for arthralgias, back pain and gait problem.   Skin:  Negative for color change and pallor.   Neurological:  Negative for dizziness.       Objective   /72 (BP Location: Left arm, Patient Position: Sitting)   Pulse 68   Temp 36.9 °C (98.4 °F)   Wt 75.2 kg (165 lb 12.8 oz)   BMI 26.76 kg/m²   BSA Body surface area is 1.87 meters squared.      Physical Exam  Constitutional:       Appearance: Normal appearance.   HENT:      Head: Normocephalic and atraumatic.   Cardiovascular:      Rate and Rhythm: Normal rate and regular rhythm.      Heart sounds: Normal heart sounds.   Pulmonary:      Effort: Pulmonary effort is normal. No respiratory distress.      Breath sounds: Normal breath sounds.   Neurological:      Mental Status: She is alert and oriented to person, place, and time. Mental status is at baseline.   Psychiatric:         Behavior: Behavior normal.       Office Visit on 12/16/2024   Component Date Value Ref Range Status    POC Color, Urine " 12/16/2024 Yellow  Straw, Yellow, Light-Yellow Final    POC Appearance, Urine 12/16/2024 Clear  Clear Final    POC Glucose, Urine 12/16/2024 NEGATIVE  NEGATIVE mg/dl Final    POC Bilirubin, Urine 12/16/2024 NEGATIVE  NEGATIVE Final    POC Ketones, Urine 12/16/2024 80 (3+) (A)  NEGATIVE mg/dl Final    POC Specific Gravity, Urine 12/16/2024 1.010  1.005 - 1.035 Final    POC Blood, Urine 12/16/2024 TRACE-Intact (A)  NEGATIVE Final    POC PH, Urine 12/16/2024 5.5  No Reference Range Established PH Final    POC Protein, Urine 12/16/2024 NEGATIVE  NEGATIVE, 30 (1+) mg/dl Final    POC Urobilinogen, Urine 12/16/2024 0.2  0.2, 1.0 EU/DL Final    Poc Nitrite, Urine 12/16/2024 NEGATIVE  NEGATIVE Final    POC Leukocytes, Urine 12/16/2024 NEGATIVE  NEGATIVE Final    Urine Culture 12/16/2024 No growth   Final   Lab on 12/05/2024   Component Date Value Ref Range Status    Cholesterol 12/05/2024 204 (H)  0 - 199 mg/dL Final          Age      Desirable   Borderline High   High     0-19 Y     0 - 169       170 - 199     >/= 200    20-24 Y     0 - 189       190 - 224     >/= 225         >24 Y     0 - 199       200 - 239     >/= 240   **All ranges are based on fasting samples. Specific   therapeutic targets will vary based on patient-specific   cardiac risk.    Pediatric guidelines reference:Pediatrics 2011, 128(S5).Adult guidelines reference: NCEP ATPIII Guidelines,FARIHA 2001, 258:2486-97    Venipuncture immediately after or during the administration of Metamizole may lead to falsely low results. Testing should be performed immediately prior to Metamizole dosing.    HDL-Cholesterol 12/05/2024 46.0  mg/dL Final      Age       Very Low   Low     Normal    High    0-19 Y    < 35      < 40     40-45     ----  20-24 Y    ----     < 40      >45      ----        >24 Y      ----     < 40     40-60      >60      Cholesterol/HDL Ratio 12/05/2024 4.4   Final      Ref Values  Desirable  < 3.4  High Risk  > 5.0    LDL Calculated 12/05/2024 126 (H)   <=99 mg/dL Final                                Near   Borderline      AGE      Desirable  Optimal    High     High     Very High     0-19 Y     0 - 109     ---    110-129   >/= 130     ----    20-24 Y     0 - 119     ---    120-159   >/= 160     ----      >24 Y     0 -  99   100-129  130-159   160-189     >/=190      VLDL 12/05/2024 32  0 - 40 mg/dL Final    Triglycerides 12/05/2024 159 (H)  0 - 149 mg/dL Final    Age              Desirable        Borderline         High        Very High  SEX:B           mg/dL             mg/dL               mg/dL      mg/dL  <=14D                       ----               ----        ----  15D-365D                    ----               ----        ----  1Y-9Y           0-74               75-99             >=100       ----  10Y-19Y        0-89                            >=130       ----  20Y-24Y        0-114             115-149             >=150      ----  >= 25Y         0-149             150-199             200-499    >=500      Venipuncture immediately after or during the administration of Metamizole may lead to falsely low results. Testing should be performed immediately prior to Metamizole dosing.    Non HDL Cholesterol 12/05/2024 158 (H)  0 - 149 mg/dL Final          Age       Desirable   Borderline High   High     Very High     0-19 Y     0 - 119       120 - 144     >/= 145    >/= 160    20-24 Y     0 - 149       150 - 189     >/= 190      ----         >24 Y    30 mg/dL above LDL Cholesterol goal      Vitamin D, 25-Hydroxy, Total 12/05/2024 49  30 - 100 ng/mL Final    Vitamin B12 12/05/2024 500  211 - 911 pg/mL Final    WBC 12/05/2024 5.3  4.4 - 11.3 x10*3/uL Final    nRBC 12/05/2024 0.0  0.0 - 0.0 /100 WBCs Final    RBC 12/05/2024 3.97 (L)  4.00 - 5.20 x10*6/uL Final    Hemoglobin 12/05/2024 12.0  12.0 - 16.0 g/dL Final    Hematocrit 12/05/2024 36.8  36.0 - 46.0 % Final    MCV 12/05/2024 93  80 - 100 fL Final    MCH 12/05/2024 30.2  26.0 - 34.0 pg Final     MCHC 12/05/2024 32.6  32.0 - 36.0 g/dL Final    RDW 12/05/2024 13.8  11.5 - 14.5 % Final    Platelets 12/05/2024 230  150 - 450 x10*3/uL Final    Neutrophils % 12/05/2024 53.4  40.0 - 80.0 % Final    Immature Granulocytes %, Automated 12/05/2024 0.4  0.0 - 0.9 % Final    Immature Granulocyte Count (IG) includes promyelocytes, myelocytes and metamyelocytes but does not include bands. Percent differential counts (%) should be interpreted in the context of the absolute cell counts (cells/UL).    Lymphocytes % 12/05/2024 30.3  13.0 - 44.0 % Final    Monocytes % 12/05/2024 13.1  2.0 - 10.0 % Final    Eosinophils % 12/05/2024 2.4  0.0 - 6.0 % Final    Basophils % 12/05/2024 0.4  0.0 - 2.0 % Final    Neutrophils Absolute 12/05/2024 2.85  1.20 - 7.70 x10*3/uL Final    Percent differential counts (%) should be interpreted in the context of the absolute cell counts (cells/uL).    Immature Granulocytes Absolute, Au* 12/05/2024 0.02  0.00 - 0.70 x10*3/uL Final    Lymphocytes Absolute 12/05/2024 1.62  1.20 - 4.80 x10*3/uL Final    Monocytes Absolute 12/05/2024 0.70  0.10 - 1.00 x10*3/uL Final    Eosinophils Absolute 12/05/2024 0.13  0.00 - 0.70 x10*3/uL Final    Basophils Absolute 12/05/2024 0.02  0.00 - 0.10 x10*3/uL Final    Thyroid Stimulating Hormone 12/05/2024 2.43  0.44 - 3.98 mIU/L Final    Glucose 12/05/2024 84  74 - 99 mg/dL Final    Sodium 12/05/2024 139  136 - 145 mmol/L Final    Potassium 12/05/2024 3.7  3.5 - 5.3 mmol/L Final    Chloride 12/05/2024 101  98 - 107 mmol/L Final    Bicarbonate 12/05/2024 28  21 - 32 mmol/L Final    Anion Gap 12/05/2024 14  10 - 20 mmol/L Final    Urea Nitrogen 12/05/2024 11  6 - 23 mg/dL Final    Creatinine 12/05/2024 0.58  0.50 - 1.05 mg/dL Final    eGFR 12/05/2024 >90  >60 mL/min/1.73m*2 Final    Calculations of estimated GFR are performed using the 2021 CKD-EPI Study Refit equation without the race variable for the IDMS-Traceable creatinine  methods.  https://jasn.asnjournals.org/content/early/2021/09/22/ASN.6661640616    Calcium 12/05/2024 9.4  8.6 - 10.6 mg/dL Final    Albumin 12/05/2024 4.4  3.4 - 5.0 g/dL Final    Alkaline Phosphatase 12/05/2024 67  33 - 136 U/L Final    Total Protein 12/05/2024 7.1  6.4 - 8.2 g/dL Final    AST 12/05/2024 26  9 - 39 U/L Final    Bilirubin, Total 12/05/2024 0.5  0.0 - 1.2 mg/dL Final    ALT 12/05/2024 17  7 - 45 U/L Final    Patients treated with Sulfasalazine may generate falsely decreased results for ALT.     Current Outpatient Medications on File Prior to Visit   Medication Sig Dispense Refill    acetaminophen (Tylenol) 500 mg tablet Take 1 tablet (500 mg) by mouth twice a day.      biotin 10,000 mcg capsule Take 1 capsule (10 mg) by mouth once daily.      cholecalciferol (Vitamin D-3) 25 MCG (1000 UT) capsule Take 1 capsule (25 mcg) by mouth once daily.      clobetasol (Temovate) 0.05 % cream Apply 1 Application topically twice a day.      lisinopril 40 mg tablet Take 1 tablet (40 mg) by mouth once daily. 90 tablet 0    multivitamin tablet Take 1 tablet by mouth once daily.      sulfacetamide sodium-sulfur 9.8-4.8 % cleanser APPLY INCH  apply to face daily      [DISCONTINUED] lisinopril 20 mg tablet Take 1 tablet (20 mg) by mouth once daily. 90 tablet 1     No current facility-administered medications on file prior to visit.     No images are attached to the encounter.            Assessment/Plan   Diagnoses and all orders for this visit:  Acute UTI  1.  Patient to start antibiotics  2.  Patient to call questions or concern

## 2024-12-22 LAB — BACTERIA UR CULT: ABNORMAL

## 2024-12-23 LAB — BACTERIA UR CULT: ABNORMAL

## 2024-12-24 ENCOUNTER — PATIENT OUTREACH (OUTPATIENT)
Dept: PRIMARY CARE | Facility: CLINIC | Age: 69
End: 2024-12-24
Payer: MEDICARE

## 2024-12-24 NOTE — PROGRESS NOTES
Discharge Facility:  [facility]   Discharge Diagnosis:   Urinary tract infection,  Admission Date:  12/20/24   Discharge Date:   12/22/24    PCP Appointment Date:  12/27/24   Specialist Appointment Date:  1/20/25 [facility]   Hospital Encounter and Summary Linked: no- no info in epic     Unable to reach Patient x 2.  LVM requesting return call.

## 2024-12-27 ENCOUNTER — OFFICE VISIT (OUTPATIENT)
Dept: PRIMARY CARE | Facility: CLINIC | Age: 69
End: 2024-12-27
Payer: MEDICARE

## 2024-12-27 VITALS
BODY MASS INDEX: 26.1 KG/M2 | DIASTOLIC BLOOD PRESSURE: 81 MMHG | SYSTOLIC BLOOD PRESSURE: 148 MMHG | HEIGHT: 66 IN | HEART RATE: 66 BPM | WEIGHT: 162.4 LBS | OXYGEN SATURATION: 98 %

## 2024-12-27 DIAGNOSIS — Z96.0 URETERAL STENT PRESENT: ICD-10-CM

## 2024-12-27 DIAGNOSIS — Z09 HOSPITAL DISCHARGE FOLLOW-UP: Primary | ICD-10-CM

## 2024-12-27 DIAGNOSIS — N30.01 ACUTE CYSTITIS WITH HEMATURIA: ICD-10-CM

## 2024-12-27 DIAGNOSIS — N20.0 RIGHT NEPHROLITHIASIS: ICD-10-CM

## 2024-12-27 PROCEDURE — 3077F SYST BP >= 140 MM HG: CPT | Performed by: STUDENT IN AN ORGANIZED HEALTH CARE EDUCATION/TRAINING PROGRAM

## 2024-12-27 PROCEDURE — 99495 TRANSJ CARE MGMT MOD F2F 14D: CPT | Performed by: STUDENT IN AN ORGANIZED HEALTH CARE EDUCATION/TRAINING PROGRAM

## 2024-12-27 PROCEDURE — 3008F BODY MASS INDEX DOCD: CPT | Performed by: STUDENT IN AN ORGANIZED HEALTH CARE EDUCATION/TRAINING PROGRAM

## 2024-12-27 PROCEDURE — 1159F MED LIST DOCD IN RCRD: CPT | Performed by: STUDENT IN AN ORGANIZED HEALTH CARE EDUCATION/TRAINING PROGRAM

## 2024-12-27 PROCEDURE — 3079F DIAST BP 80-89 MM HG: CPT | Performed by: STUDENT IN AN ORGANIZED HEALTH CARE EDUCATION/TRAINING PROGRAM

## 2024-12-27 PROCEDURE — 1160F RVW MEDS BY RX/DR IN RCRD: CPT | Performed by: STUDENT IN AN ORGANIZED HEALTH CARE EDUCATION/TRAINING PROGRAM

## 2024-12-27 PROCEDURE — 1036F TOBACCO NON-USER: CPT | Performed by: STUDENT IN AN ORGANIZED HEALTH CARE EDUCATION/TRAINING PROGRAM

## 2024-12-27 ASSESSMENT — ENCOUNTER SYMPTOMS
HEADACHES: 0
SHORTNESS OF BREATH: 0
HEMATURIA: 0
DIARRHEA: 0
COUGH: 0
DYSURIA: 0
FREQUENCY: 0
MYALGIAS: 0
FEVER: 0
ABDOMINAL PAIN: 0
VOMITING: 0
ARTHRALGIAS: 0
DIZZINESS: 0
LIGHT-HEADEDNESS: 0
RHINORRHEA: 0
NAUSEA: 0
CHILLS: 0
FLANK PAIN: 0
FATIGUE: 0
CONSTIPATION: 0

## 2024-12-27 NOTE — PATIENT INSTRUCTIONS
Thank you for coming in for your hospital follow-up.    With all of your symptoms resolved, I do not think having any further follow-up lab testing anything here in the office.  You were treated with IV antibiotics while you are in the hospital.  If any return of symptoms starts again, please give our office a heads up.  You still have the prescription for ciprofloxacin at home but please let us know first if you have symptoms before starting the medication.    You do have a ureteral stent that was placed on the right side during your admission and this will need to be removed.  It sounds if they wanted to follow-up with you in the next 2 weeks.  I went ahead and placed a referral to one of the providers that was discussed with you and you can go ahead and call their office today to see when they could get you in.  I did dequan the referral as urgent especially with there being the timeframe within the next 1 to 2 weeks.  Please give us a heads up if you are not hearing back from their office about getting set up for an appointment.  I would recommend following up with a physician at [facility] Since that is where you are admitted.    I would asked that you please fill out a records release request form so that we can get the records from your recent hospitalization since we cannot see them in the chart since [facility] Is on a different system.    Please keep up with your scheduled appointment with your primary care physician.    Please call in sooner with any other acute concerns or complaints.    Thank you

## 2024-12-27 NOTE — PROGRESS NOTES
Subjective   Patient ID: Clay Maxwell is a 69 y.o. female who presents for Hospital Follow-up (Uti/ kidney stone).    [facility]     Hospital Follow Up  Discharge Facility:  [facility] Diagnosis:   Urinary tract infection,  Admission Date: 12/20/24   Discharge Date:  12/22/24    Hospital Course: Patient was seen in the office on 12/20/2024 for a UTI.  She was prescribed a different course of antibiotics since the first course she had been administered was not helping.  After she had gotten home she started experiencing right flank pain and started not feeling very well so she drove herself to the hospital.  She was admitted with acute UTI and found to have a kidney stone on the right side.  She was treated with IV antibiotics.  She was followed with urology and underwent a procedure with a ureteral stent placement on the right side.  She was told that she would need to set up appointment with urology as an outpatient to get the stent removed within the next 2 weeks and was given some names.  No appointment was scheduled at that time.  She was also instructed to follow-up with her primary care physician as well.  No hospital course summary or other documentation was provided.    She is following up for a UTI that she had been admitted for.  Currently she is feeling very good.  She is not having current pain.  She has still been taking some pyridium from the hospital. Last taken last night. Overall the pain is gone.  She has not been having any blood in her urine.    She needs a follow up with urology.  She had a stent placed in the right ureter for a right sided kidney stone.  They gave her contact information to call but did not set up an appointment.  She will also need the stent removed after the stone passes.    Discharge Facility:  [facility] Diagnosis:   Urinary tract infection,  Admission Date:  12/20/24   Discharge Date:   12/22/24     PCP Appointment Date:  12/27/24  "  Specialist Appointment Date:  1/20/25 [facility]   Hospital Encounter and Summary Linked: no- no info in epic      Unable to reach Patient x 2.  LVM requesting return call.     Review of Systems   Constitutional:  Negative for chills, fatigue and fever.   HENT:  Negative for congestion, postnasal drip and rhinorrhea.    Respiratory:  Negative for cough and shortness of breath.    Cardiovascular:  Negative for chest pain.   Gastrointestinal:  Negative for abdominal pain, constipation, diarrhea, nausea and vomiting.   Genitourinary:  Negative for dysuria, flank pain, frequency, hematuria and urgency.   Musculoskeletal:  Negative for arthralgias and myalgias.   Neurological:  Negative for dizziness, light-headedness and headaches.       Objective   /81   Pulse 66   Ht 1.676 m (5' 6\")   Wt 73.7 kg (162 lb 6.4 oz)   SpO2 98%   BMI 26.21 kg/m²     Physical Exam  Vitals and nursing note reviewed.   Constitutional:       General: She is not in acute distress.     Appearance: Normal appearance. She is normal weight. She is not ill-appearing or toxic-appearing.   HENT:      Head: Normocephalic and atraumatic.   Cardiovascular:      Rate and Rhythm: Normal rate and regular rhythm.      Heart sounds: Normal heart sounds.   Pulmonary:      Effort: Pulmonary effort is normal.      Breath sounds: Normal breath sounds.   Abdominal:      General: Abdomen is flat. Bowel sounds are normal. There is no distension.      Palpations: Abdomen is soft.      Tenderness: There is no abdominal tenderness. There is no right CVA tenderness, left CVA tenderness, guarding or rebound.   Neurological:      Mental Status: She is alert.         Assessment/Plan   Problem List Items Addressed This Visit             ICD-10-CM    Acute cystitis with hematuria N30.01    Relevant Orders    Referral to Urology    Right nephrolithiasis N20.0    Relevant Orders    Referral to Urology     Other Visit Diagnoses         Codes    Hospital discharge " follow-up    -  Primary Z09    Ureteral stent present     Z96.0    Relevant Orders    Referral to Urology          History and physical examination as above.  Patient presenting for hospital follow-up for initially a acute UTI that was not responding to outpatient treatment with antibiotics as well as her being found to have a right sided nephrolithiasis.  She was treated with IV antibiotics in the hospital and her UTI symptoms resolved.  She reports that she is currently pain-free and not having any other urinary symptoms.  She still has Pyridium that they have given her at home and she last took it last night.  She will hold off on any additional treatment unless she starts experiencing pain.  She was found to have a right-sided kidney stone and did have a right ureteral stent placed.  She was instructed to follow-up with urology to have this removed within the next 2 weeks.  No appointment was set up at this time.  Referral placed to urology.  Patient will call if she is having issues getting a hold of them.  Did instruct patient to call back if she does not hear anything before the end of next week after the holidays.  Patient will fill out a records release request form from [facility] Since we do not have any hospital records or results of other testing or lab work.  Discussed that if she does have return of symptoms that are more severe, she needs to go immediately to the emergency department.  She is understanding and in agreement with this plan

## 2024-12-30 ENCOUNTER — TELEPHONE (OUTPATIENT)
Dept: PRIMARY CARE | Facility: CLINIC | Age: 69
End: 2024-12-30
Payer: MEDICARE

## 2024-12-30 DIAGNOSIS — N30.01 ACUTE CYSTITIS WITH HEMATURIA: Primary | ICD-10-CM

## 2024-12-30 RX ORDER — NITROFURANTOIN 25; 75 MG/1; MG/1
100 CAPSULE ORAL 2 TIMES DAILY
Qty: 10 CAPSULE | Refills: 0 | Status: SHIPPED | OUTPATIENT
Start: 2024-12-30 | End: 2025-01-04

## 2025-01-08 ENCOUNTER — PATIENT OUTREACH (OUTPATIENT)
Dept: PRIMARY CARE | Facility: CLINIC | Age: 70
End: 2025-01-08
Payer: MEDICARE

## 2025-01-08 NOTE — PROGRESS NOTES
Call regarding appt. with PCP on 12/27/24 after hospitalization.  At time of outreach call the patient feels as if their condition has (improved) since last visit.  Urology follow up tomorrow 1/9/25 .   Pt has no concerns at this time.  Pt encouraged to call with ay questions.

## 2025-01-13 PROBLEM — N20.1 CALCULUS OF URETER: Status: ACTIVE | Noted: 2025-01-13

## 2025-01-14 ENCOUNTER — PREP FOR PROCEDURE (OUTPATIENT)
Dept: UROLOGY | Facility: HOSPITAL | Age: 70
End: 2025-01-14
Payer: MEDICARE

## 2025-01-14 DIAGNOSIS — N20.1 CALCULUS OF URETER: Primary | ICD-10-CM

## 2025-01-14 RX ORDER — CEFAZOLIN SODIUM 2 G/100ML
2 INJECTION, SOLUTION INTRAVENOUS ONCE
Status: CANCELLED | OUTPATIENT
Start: 2025-01-15 | End: 2025-01-14

## 2025-01-14 NOTE — PREPROCEDURE INSTRUCTIONS
Current Medications   Medication Instructions    acetaminophen (Tylenol) 500 mg tablet Hold    biotin 10,000 mcg capsule Hold    cholecalciferol (Vitamin D-3) 25 MCG (1000 UT) capsule Hold    clobetasol (Temovate) 0.05 % cream Hold    lisinopril 40 mg tablet Hold    multivitamin tablet Hold    sulfacetamide sodium-sulfur 9.8-4.8 % cleanser Hold       NPO Instructions:    You may have 13 ounce of clear liquids until TWO hours before surgery/procedure. This includes water, black tea/coffee, (no milk or cream) apple juice and electrolyte drinks (Gatorade).    Additional Instructions: Arrival 9:30 for 11:00 surgery  Enter through the main entrance of [facility], located at [address] Proceed to registration, located on the right hand side of the staircase. You will need your ID and insurance card for registration. Please ensure you have a responsible adult to drive you home. A responsible adult DOES NOT include rideshare service drivers ([employer], Lyft, etc), cab drivers, or public transportation drivers, but “provide a ride” is acceptable.    Take a shower before your procedure. After your shower avoid lotions, powders, deodorants or anything applied to the skin. If you wear contacts or glasses, wear the glasses. If you do not have glasses, please bring a case for your contacts. You may wear hearing aids and dentures, bring a case for them or we will provide one. Make sure you wear something loose and comfortable. Keep in mind your surgical procedure and wear something that will accommodate incisions or bandages. Please remove all jewelry and piercing's.     For further questions Ian MEMBRENO can be contacted at [phone] between 7AM-3PM.

## 2025-01-15 ENCOUNTER — ANESTHESIA EVENT (OUTPATIENT)
Dept: OPERATING ROOM | Facility: HOSPITAL | Age: 70
End: 2025-01-15
Payer: MEDICARE

## 2025-01-15 ENCOUNTER — HOSPITAL ENCOUNTER (OUTPATIENT)
Dept: CARDIOLOGY | Facility: HOSPITAL | Age: 70
Discharge: HOME | End: 2025-01-15
Payer: MEDICARE

## 2025-01-15 ENCOUNTER — ANESTHESIA (OUTPATIENT)
Dept: OPERATING ROOM | Facility: HOSPITAL | Age: 70
End: 2025-01-15
Payer: MEDICARE

## 2025-01-15 ENCOUNTER — HOSPITAL ENCOUNTER (OUTPATIENT)
Facility: HOSPITAL | Age: 70
Setting detail: OUTPATIENT SURGERY
Discharge: HOME | End: 2025-01-15
Attending: UROLOGY | Admitting: UROLOGY
Payer: MEDICARE

## 2025-01-15 ENCOUNTER — APPOINTMENT (OUTPATIENT)
Dept: RADIOLOGY | Facility: HOSPITAL | Age: 70
End: 2025-01-15
Payer: MEDICARE

## 2025-01-15 VITALS
WEIGHT: 162.48 LBS | BODY MASS INDEX: 26.11 KG/M2 | OXYGEN SATURATION: 100 % | DIASTOLIC BLOOD PRESSURE: 89 MMHG | SYSTOLIC BLOOD PRESSURE: 155 MMHG | HEART RATE: 62 BPM | TEMPERATURE: 97 F | HEIGHT: 66 IN | RESPIRATION RATE: 18 BRPM

## 2025-01-15 DIAGNOSIS — N20.1 CALCULUS OF URETER: Primary | ICD-10-CM

## 2025-01-15 PROCEDURE — C1769 GUIDE WIRE: HCPCS | Performed by: UROLOGY

## 2025-01-15 PROCEDURE — 3600000008 HC OR TIME - EACH INCREMENTAL 1 MINUTE - PROCEDURE LEVEL THREE: Performed by: UROLOGY

## 2025-01-15 PROCEDURE — 7100000002 HC RECOVERY ROOM TIME - EACH INCREMENTAL 1 MINUTE: Performed by: UROLOGY

## 2025-01-15 PROCEDURE — 7100000001 HC RECOVERY ROOM TIME - INITIAL BASE CHARGE: Performed by: UROLOGY

## 2025-01-15 PROCEDURE — 2500000004 HC RX 250 GENERAL PHARMACY W/ HCPCS (ALT 636 FOR OP/ED): Performed by: ANESTHESIOLOGIST ASSISTANT

## 2025-01-15 PROCEDURE — 2500000005 HC RX 250 GENERAL PHARMACY W/O HCPCS: Performed by: UROLOGY

## 2025-01-15 PROCEDURE — 3700000002 HC GENERAL ANESTHESIA TIME - EACH INCREMENTAL 1 MINUTE: Performed by: UROLOGY

## 2025-01-15 PROCEDURE — 2720000007 HC OR 272 NO HCPCS: Performed by: UROLOGY

## 2025-01-15 PROCEDURE — 76000 FLUOROSCOPY <1 HR PHYS/QHP: CPT | Mod: 59

## 2025-01-15 PROCEDURE — A52356 PR CYSTO/URETERO W/LITHOTRIPSY  AND INDWELL STENT INSRT: Performed by: ANESTHESIOLOGY

## 2025-01-15 PROCEDURE — C2617 STENT, NON-COR, TEM W/O DEL: HCPCS | Performed by: UROLOGY

## 2025-01-15 PROCEDURE — 93005 ELECTROCARDIOGRAM TRACING: CPT

## 2025-01-15 PROCEDURE — 7100000009 HC PHASE TWO TIME - INITIAL BASE CHARGE: Performed by: UROLOGY

## 2025-01-15 PROCEDURE — 3600000003 HC OR TIME - INITIAL BASE CHARGE - PROCEDURE LEVEL THREE: Performed by: UROLOGY

## 2025-01-15 PROCEDURE — 2780000003 HC OR 278 NO HCPCS: Performed by: UROLOGY

## 2025-01-15 PROCEDURE — 2550000001 HC RX 255 CONTRASTS: Performed by: UROLOGY

## 2025-01-15 PROCEDURE — 3700000001 HC GENERAL ANESTHESIA TIME - INITIAL BASE CHARGE: Performed by: UROLOGY

## 2025-01-15 PROCEDURE — A52356 PR CYSTO/URETERO W/LITHOTRIPSY  AND INDWELL STENT INSRT: Performed by: ANESTHESIOLOGIST ASSISTANT

## 2025-01-15 PROCEDURE — 2500000005 HC RX 250 GENERAL PHARMACY W/O HCPCS: Performed by: ANESTHESIOLOGIST ASSISTANT

## 2025-01-15 PROCEDURE — 7100000010 HC PHASE TWO TIME - EACH INCREMENTAL 1 MINUTE: Performed by: UROLOGY

## 2025-01-15 PROCEDURE — 2500000004 HC RX 250 GENERAL PHARMACY W/ HCPCS (ALT 636 FOR OP/ED): Performed by: UROLOGY

## 2025-01-15 RX ORDER — PROPOFOL 10 MG/ML
INJECTION, EMULSION INTRAVENOUS AS NEEDED
Status: DISCONTINUED | OUTPATIENT
Start: 2025-01-15 | End: 2025-01-15

## 2025-01-15 RX ORDER — NORETHINDRONE AND ETHINYL ESTRADIOL 0.5-0.035
KIT ORAL AS NEEDED
Status: DISCONTINUED | OUTPATIENT
Start: 2025-01-15 | End: 2025-01-15

## 2025-01-15 RX ORDER — WATER 1 ML/ML
IRRIGANT IRRIGATION AS NEEDED
Status: DISCONTINUED | OUTPATIENT
Start: 2025-01-15 | End: 2025-01-15 | Stop reason: HOSPADM

## 2025-01-15 RX ORDER — SODIUM CHLORIDE 0.9 G/100ML
INJECTION, SOLUTION IRRIGATION AS NEEDED
Status: DISCONTINUED | OUTPATIENT
Start: 2025-01-15 | End: 2025-01-15 | Stop reason: HOSPADM

## 2025-01-15 RX ORDER — PHENYLEPHRINE HCL IN 0.9% NACL 1 MG/10 ML
SYRINGE (ML) INTRAVENOUS AS NEEDED
Status: DISCONTINUED | OUTPATIENT
Start: 2025-01-15 | End: 2025-01-15

## 2025-01-15 RX ORDER — MEPERIDINE HYDROCHLORIDE 50 MG/ML
12.5 INJECTION INTRAMUSCULAR; INTRAVENOUS; SUBCUTANEOUS EVERY 10 MIN PRN
Status: DISCONTINUED | OUTPATIENT
Start: 2025-01-15 | End: 2025-01-15 | Stop reason: HOSPADM

## 2025-01-15 RX ORDER — MIDAZOLAM HYDROCHLORIDE 1 MG/ML
1 INJECTION, SOLUTION INTRAMUSCULAR; INTRAVENOUS ONCE AS NEEDED
Status: DISCONTINUED | OUTPATIENT
Start: 2025-01-15 | End: 2025-01-15 | Stop reason: HOSPADM

## 2025-01-15 RX ORDER — FENTANYL CITRATE 50 UG/ML
INJECTION, SOLUTION INTRAMUSCULAR; INTRAVENOUS AS NEEDED
Status: DISCONTINUED | OUTPATIENT
Start: 2025-01-15 | End: 2025-01-15

## 2025-01-15 RX ORDER — ONDANSETRON HYDROCHLORIDE 2 MG/ML
4 INJECTION, SOLUTION INTRAVENOUS ONCE AS NEEDED
Status: DISCONTINUED | OUTPATIENT
Start: 2025-01-15 | End: 2025-01-15 | Stop reason: HOSPADM

## 2025-01-15 RX ORDER — LIDOCAINE HYDROCHLORIDE 20 MG/ML
INJECTION, SOLUTION INFILTRATION; PERINEURAL AS NEEDED
Status: DISCONTINUED | OUTPATIENT
Start: 2025-01-15 | End: 2025-01-15

## 2025-01-15 RX ORDER — HYDRALAZINE HYDROCHLORIDE 20 MG/ML
5 INJECTION INTRAMUSCULAR; INTRAVENOUS EVERY 30 MIN PRN
Status: DISCONTINUED | OUTPATIENT
Start: 2025-01-15 | End: 2025-01-15 | Stop reason: HOSPADM

## 2025-01-15 RX ORDER — LIDOCAINE HYDROCHLORIDE 10 MG/ML
0.1 INJECTION, SOLUTION INFILTRATION; PERINEURAL ONCE
Status: DISCONTINUED | OUTPATIENT
Start: 2025-01-15 | End: 2025-01-15 | Stop reason: HOSPADM

## 2025-01-15 RX ORDER — ALBUTEROL SULFATE 0.83 MG/ML
2.5 SOLUTION RESPIRATORY (INHALATION) ONCE AS NEEDED
Status: DISCONTINUED | OUTPATIENT
Start: 2025-01-15 | End: 2025-01-15 | Stop reason: HOSPADM

## 2025-01-15 RX ORDER — LABETALOL HYDROCHLORIDE 5 MG/ML
5 INJECTION, SOLUTION INTRAVENOUS ONCE AS NEEDED
Status: DISCONTINUED | OUTPATIENT
Start: 2025-01-15 | End: 2025-01-15 | Stop reason: HOSPADM

## 2025-01-15 RX ORDER — CIPROFLOXACIN 2 MG/ML
400 INJECTION, SOLUTION INTRAVENOUS ONCE
Status: COMPLETED | OUTPATIENT
Start: 2025-01-15 | End: 2025-01-15

## 2025-01-15 RX ORDER — SODIUM CHLORIDE, SODIUM LACTATE, POTASSIUM CHLORIDE, CALCIUM CHLORIDE 600; 310; 30; 20 MG/100ML; MG/100ML; MG/100ML; MG/100ML
100 INJECTION, SOLUTION INTRAVENOUS CONTINUOUS
Status: DISCONTINUED | OUTPATIENT
Start: 2025-01-15 | End: 2025-01-15 | Stop reason: HOSPADM

## 2025-01-15 RX ORDER — CEFAZOLIN SODIUM 2 G/100ML
2 INJECTION, SOLUTION INTRAVENOUS ONCE
Status: DISCONTINUED | OUTPATIENT
Start: 2025-01-15 | End: 2025-01-15 | Stop reason: HOSPADM

## 2025-01-15 RX ORDER — ACETAMINOPHEN 325 MG/1
650 TABLET ORAL EVERY 4 HOURS PRN
Status: DISCONTINUED | OUTPATIENT
Start: 2025-01-15 | End: 2025-01-15 | Stop reason: HOSPADM

## 2025-01-15 RX ORDER — ONDANSETRON HYDROCHLORIDE 2 MG/ML
INJECTION, SOLUTION INTRAVENOUS AS NEEDED
Status: DISCONTINUED | OUTPATIENT
Start: 2025-01-15 | End: 2025-01-15

## 2025-01-15 RX ADMIN — PROPOFOL 200 MG: 10 INJECTION, EMULSION INTRAVENOUS at 10:29

## 2025-01-15 RX ADMIN — FENTANYL CITRATE 50 MCG: 50 INJECTION, SOLUTION INTRAMUSCULAR; INTRAVENOUS at 10:26

## 2025-01-15 RX ADMIN — Medication 150 MCG: at 10:46

## 2025-01-15 RX ADMIN — ONDANSETRON 4 MG: 2 INJECTION, SOLUTION INTRAMUSCULAR; INTRAVENOUS at 11:07

## 2025-01-15 RX ADMIN — EPHEDRINE SULFATE 10 MG: 50 INJECTION, SOLUTION INTRAVENOUS at 10:54

## 2025-01-15 RX ADMIN — DEXAMETHASONE SODIUM PHOSPHATE 4 MG: 4 INJECTION, SOLUTION INTRAMUSCULAR; INTRAVENOUS at 10:41

## 2025-01-15 RX ADMIN — DEXAMETHASONE SODIUM PHOSPHATE 4 MG: 4 INJECTION, SOLUTION INTRAMUSCULAR; INTRAVENOUS at 10:33

## 2025-01-15 RX ADMIN — CIPROFLOXACIN 400 MG: 2 INJECTION, SOLUTION INTRAVENOUS at 10:10

## 2025-01-15 RX ADMIN — FENTANYL CITRATE 50 MCG: 50 INJECTION, SOLUTION INTRAMUSCULAR; INTRAVENOUS at 10:55

## 2025-01-15 RX ADMIN — Medication 100 MCG: at 10:49

## 2025-01-15 RX ADMIN — SODIUM CHLORIDE, SODIUM LACTATE, POTASSIUM CHLORIDE, AND CALCIUM CHLORIDE: .6; .31; .03; .02 INJECTION, SOLUTION INTRAVENOUS at 10:24

## 2025-01-15 RX ADMIN — LIDOCAINE HYDROCHLORIDE 80 MG: 20 INJECTION, SOLUTION INFILTRATION; PERINEURAL at 10:28

## 2025-01-15 RX ADMIN — Medication 100 MCG: at 10:43

## 2025-01-15 SDOH — HEALTH STABILITY: MENTAL HEALTH: CURRENT SMOKER: 0

## 2025-01-15 ASSESSMENT — PAIN SCALES - GENERAL
PAINLEVEL_OUTOF10: 0 - NO PAIN

## 2025-01-15 ASSESSMENT — ENCOUNTER SYMPTOMS
RESPIRATORY NEGATIVE: 1
MUSCULOSKELETAL NEGATIVE: 1
EYES NEGATIVE: 1
CONSTITUTIONAL NEGATIVE: 1
CARDIOVASCULAR NEGATIVE: 1

## 2025-01-15 ASSESSMENT — PAIN - FUNCTIONAL ASSESSMENT
PAIN_FUNCTIONAL_ASSESSMENT: 0-10
PAIN_FUNCTIONAL_ASSESSMENT: 0-10

## 2025-01-15 NOTE — ANESTHESIA POSTPROCEDURE EVALUATION
"Patient: Margaret Maxwell \"Clay\"    Procedure Summary       Date: 01/15/25 Room / Location: [facility] OR 03 / [facility]    Anesthesia Start: 1024 Anesthesia Stop: 1125    Procedure: CYSTOSCOPY/ RIGHT URETEROSCOPY LASER LITHOTRIPSY/ RIGHT STENT INSERTION WITH C-ARM (Right) Diagnosis:       Calculus of ureter      (Calculus of ureter [N20.1])    Surgeons: Tay Lin MD Responsible Provider: Edward Gaines MD    Anesthesia Type: general ASA Status: 2            Anesthesia Type: general    Vitals Value Taken Time   /72 01/15/25 1123   Temp 37.1 01/15/25 1125   Pulse 73 01/15/25 1124   Resp 14 01/15/25 1125   SpO2 99 % 01/15/25 1124   Vitals shown include unfiled device data.    Anesthesia Post Evaluation    Patient location during evaluation: PACU  Patient participation: complete - patient participated  Level of consciousness: awake  Pain management: adequate  Airway patency: patent  Cardiovascular status: acceptable  Respiratory status: acceptable  Hydration status: acceptable  Postoperative Nausea and Vomiting: none      No notable events documented.    "

## 2025-01-15 NOTE — ANESTHESIA PREPROCEDURE EVALUATION
"Patient: Margaret Maxwell \"Clay\"    Procedure Information       Date/Time: 01/15/25 1030    Procedure: CYSTOSCOPY/ RIGHT URETEROSCOPY LASER LITHOTRIPSY/ RIGHT STENT INSERTION WITH C-ARM (Right) - cysto R ULL R stent    Location: [facility] 03 / [facility]    Surgeons: Tay Lin MD            Relevant Problems   Anesthesia (within normal limits)      Cardiac   (+) Essential hypertension   (+) Mixed hyperlipidemia      /Renal   (+) Acute cystitis with hematuria   (+) Right nephrolithiasis      Musculoskeletal   (+) Chronic bilateral low back pain   (+) Osteoarthritis of thoracolumbar spine   (+) Spinal stenosis of lumbosacral region      Skin   (+) Rash       Clinical information reviewed:   Tobacco  Allergies  Meds   Med Hx  Surg Hx  OB Status  Fam Hx  Soc   Hx        NPO Detail:  NPO/Void Status  Carbohydrate Drink Given Prior to Surgery? : N  Date of Last Liquid: 01/15/25  Time of Last Liquid: 0300 (antibotic at 0300)  Date of Last Solid: 01/14/25  Time of Last Solid: 2100  Last Intake Type: Solid meal  Time of Last Void: 0956         Physical Exam    Airway  Mallampati: II  TM distance: >3 FB  Neck ROM: full     Cardiovascular   Rhythm: regular  Rate: normal     Dental - normal exam     Pulmonary    Abdominal        Anesthesia Plan    History of general anesthesia?: yes  History of complications of general anesthesia?: no    ASA 2     general     The patient is not a current smoker.  Patient was not previously instructed to abstain from smoking on day of procedure.  Patient did not smoke on day of procedure.    intravenous induction   Anesthetic plan and risks discussed with patient.  Use of blood products discussed with patient who.    Plan discussed with CAA.  "

## 2025-01-15 NOTE — H&P
"History Of Present Illness  Margaret Maxwell \"Clay\" is a 69 y.o. female presenting with right ureteral stone status post right ureteral stent.     Past Medical History  Past Medical History:   Diagnosis Date    Acute upper respiratory infection, unspecified 12/07/2016    Viral URI with cough    Arthritis     Cramp and spasm     Foot cramps    Dysuria 01/25/2016    Burning with urination    Hypertension     Other chest pain 12/02/2015    Chest discomfort    Personal history of colonic polyps 07/26/2022    History of colonic polyps    Personal history of diseases of the blood and blood-forming organs and certain disorders involving the immune mechanism 07/18/2018    History of anemia    Personal history of other drug therapy 04/22/2021    History of pneumococcal vaccination    Personal history of other endocrine, nutritional and metabolic disease 06/05/2017    History of hypercalcemia    Personal history of other endocrine, nutritional and metabolic disease     History of obesity    Plantar fascial fibromatosis 09/11/2014    Plantar fasciitis    Polyp of colon 01/29/2024    Recurrent subluxation of patella, unspecified knee     Patellofemoral instability    Urinary tract infection     Urinary tract infection, site not specified 01/25/2016    UTI (lower urinary tract infection)       Surgical History  Past Surgical History:   Procedure Laterality Date    COLONOSCOPY W/ POLYPECTOMY  12/04/2017    Complete Colonoscopy For Polyp Removal    EYE SURGERY  01/01/2002    TONSILLECTOMY  05/27/2014    Tonsillectomy    WISDOM TOOTH EXTRACTION  01/01/1986        Social History  She reports that she has never smoked. She has never been exposed to tobacco smoke. She has never used smokeless tobacco. She reports that she does not drink alcohol and does not use drugs.    Family History  Family History   Problem Relation Name Age of Onset    Stroke Mother Adela Cullen     Hypothyroidism Mother Adela Cullen     Breast cancer " Mother Adela Cullen 80    Atrial fibrillation Mother Adela Cullen     Valvular heart disease Mother Adela Cullen     Arthritis Mother Adela Cullen     Cancer Mother Adela Cullen     Hearing loss Mother Adela Cullen     Heart disease Mother Adela Cullen     Hyperlipidemia Mother Adela Cullen     Hypertension Mother Adela Cullen     Pulmonary embolism Father Pepe Cullen 36    Early natural death Father Pepe Cullen     Heart disease Father Pepe Cullen     Hypertension Sister 3 sisters     Hyperlipidemia Sister 3 sisters     Hypertension Brother Polo Cullen     Hyperlipidemia Brother Polo Cullen     Diabetes type II Mother's Brother      Kidney failure Mother's Brother      Stroke Maternal Grandmother Florentino Cullen     Hypertension Maternal Grandmother Florentino Cullen     Other (CVD) Maternal Grandmother Florentino Cullen     Stroke Maternal Grandfather Florentino Cullen     Hypertension Maternal Grandfather Florentino Cullen     Stroke Paternal Grandfather Florentino J     Hypertension Paternal Grandfather Florentino EVANS     Arthritis Mother's Brother Polo Abhijit     Depression Mother's Brother Polo Lacey     Diabetes Mother's Brother Polo Lacey     Hypertension Mother's Brother Polo Abhijit     Kidney disease Mother's Brother Polo Lacey         Allergies  Patient has no known allergies.    Review of Systems   Constitutional: Negative.    HENT: Negative.     Eyes: Negative.    Respiratory: Negative.     Cardiovascular: Negative.    Musculoskeletal: Negative.         Physical Exam  Constitutional:       Appearance: Normal appearance.   HENT:      Mouth/Throat:      Mouth: Mucous membranes are moist.   Eyes:      Pupils: Pupils are equal, round, and reactive to light.   Cardiovascular:      Rate and Rhythm: Normal rate.   Pulmonary:      Breath sounds: Normal breath sounds.   Neurological:      Mental Status: She is alert.          Last Recorded Vitals  Weight 73.7 kg (162  lb 7.7 oz).    Relevant Results             Assessment/Plan   Assessment & Plan  Calculus of ureter      Right ureteral stone status post right ureteral stent, she is here for definitive stone management       I spent 10 minutes in the professional and overall care of this patient.      Tay Lin MD

## 2025-01-15 NOTE — OP NOTE
"CYSTOSCOPY/ RIGHT URETEROSCOPY LASER LITHOTRIPSY/ RIGHT STENT INSERTION WITH C-ARM (R) Operative Note     Date: 1/15/2025  OR Location: [facility]    Name: Margaret Maxwell \"Clay\", : 1955, Age: 69 y.o., MRN: [id number], Sex: female    Diagnosis  Pre-op Diagnosis      * Calculus of ureter [N20.1] Post-op Diagnosis     * Calculus of ureter [N20.1]     Procedures  CYSTOSCOPY/ RIGHT URETEROSCOPY LASER LITHOTRIPSY/ RIGHT STENT INSERTION WITH C-ARM  29292 - UT CYSTO/URETERO W/LITHOTRIPSY &INDWELL STENT INSRT      Surgeons      * Tay Lin - Primary    Resident/Fellow/Other Assistant:  Surgeons and Role:  * No surgeons found with a matching role *    Staff:   Circulator: Vinita Kwok Person: Ainsley    Anesthesia Staff: Anesthesiologist: Edward Gaines MD  C-AA: JOCELYN Bloom  VIDHYA: Sunday Donald    Procedure Summary  Anesthesia: General  ASA: II  Estimated Blood Loss: 5 mL  Intra-op Medications:   Administrations occurring from 1030 to 1200 on 01/15/25:   Medication Name Total Dose   sodium chloride 0.9 % irrigation solution 3,000 mL   sterile water irrigation solution 1,000 mL   iohexol (OMNIPaque) 240 mg iodine/mL solution 10 mL   dexAMETHasone (Decadron) injection 4 mg/mL 8 mg   ePHEDrine 50 mg/mL 10 mg   fentaNYL PF 0.05 mg/mL 50 mcg   LR bolus Cannot be calculated   ondansetron (Zofran) 2 mg/mL injection 4 mg   phenylephrine 100 mcg/mL syringe 10 mL (prefilled) 350 mcg              Anesthesia Record               Intraprocedure I/O Totals          Intake    LR bolus 600.00 mL    Total Intake 600 mL          Specimen: No specimens collected              Drains and/or Catheters:   Ureteral Drain/Stent Right ureter  (Active)       Tourniquet Times:         Implants:     Findings: right kidney stone    Indications: Margaret Maxwell \"Clay\" is an 69 y.o. female who is having surgery for Calculus of ureter [N20.1].     The patient was seen in the preoperative area. The risks, benefits, complications, " treatment options, non-operative alternatives, expected recovery and outcomes were discussed with the patient. The possibilities of reaction to medication, pulmonary aspiration, injury to surrounding structures, bleeding, recurrent infection, the need for additional procedures, failure to diagnose a condition, and creating a complication requiring transfusion or operation were discussed with the patient. The patient concurred with the proposed plan, giving informed consent.  The site of surgery was properly noted/marked if necessary per policy. The patient has been actively warmed in preoperative area. Preoperative antibiotics have been ordered and given within 1 hours of incision. Venous thrombosis prophylaxis have been ordered including bilateral sequential compression devices    Procedure Details: The patient was identified preprocedure by verbal and written identification. The patient was taken to the operating room and placed on the table in supine position.  Anesthesia was administered. The patient was then placed in relaxed dorsal lithotomy position with care taken to avoid pressure points. The lower abdomen and genitalia were prepped and draped in standard fashion. Cystourethroscopy was performed with a 22 Austrian sheath and 30° lens. The urethra was within normal limitsin course and caliber, bladder outlet was non occlusive, bladder capacity was adequate, ureteral orifices were in good position and morphology, trabeculation was 2 /4 and mucosa inspection showed no evidence of erythematous or papillary lesions suggestive of malignancy.  Attention was turned to the right ureteral orifice. This was intubated with a 5 Austrian open-ended catheter. Using fluoroscopic guidance, a 0.038 sensor wire was advanced up the ureter and curled in the collecting system.     A rigid ureteroscope was inserted, advanced all the way up to the proximal ureter with no evidence of stones, the ureter looked relatively narrow,  decision was made to proceed without placement access sheath, Super Stiff wire was inserted under direct vision, over that a flexible ureteroscope was advanced, the stone was identified in the interpolar calyx, using holmium laser at settings of 0.5 by 20 Hz dusting was performed, at the end there were no stones amenable for basketing.     Gentle injection of 5 ml of diluted contrast was performed to outline the collecting system, which showed moderate hydro .    A 6 F x 24 cm JJ stent was advanced over the wire. When the tip of the stent was in the region of the collecting system, the wire was slowly withdrawn. There was a good curl seen on fluoroscopy. The pusher was then used to advance the end of the stent into the bladder and the wire was completely removed. There was a good visual curl seen in the bladder. The bladder was then drained and the scope was removed. The patient was taken out of lithotomy position, awakened in the operating room, and taken to the recovery room in good and stable condition with no immediate complications.   Complications:  None; patient tolerated the procedure well.    Disposition: PACU - hemodynamically stable.  Condition: stable                 Additional Details: Stent to be removed in 3 days    Attending Attestation: I performed the procedure.    Tay Lin  Phone Number: [phone]

## 2025-01-15 NOTE — ANESTHESIA PROCEDURE NOTES
Airway  Date/Time: 1/15/2025 10:30 AM  Urgency: elective      Staffing  Performed: JOCELYN   Authorized by: Edward Gaines MD    Performed by: JOCELYN Bloom  Patient location during procedure: OR    Indications and Patient Condition  Indications for airway management: anesthesia  Sedation level: deep  Preoxygenated: yes  Patient position: sniffing  Mask difficulty assessment: 1 - vent by mask    Final Airway Details  Final airway type: supraglottic airway      Successful airway: Size 4     Number of attempts at approach: 1

## 2025-01-16 LAB
ATRIAL RATE: 59 BPM
P AXIS: 4 DEGREES
P OFFSET: 207 MS
P ONSET: 155 MS
PR INTERVAL: 140 MS
Q ONSET: 225 MS
QRS COUNT: 10 BEATS
QRS DURATION: 82 MS
QT INTERVAL: 416 MS
QTC CALCULATION(BAZETT): 411 MS
QTC FREDERICIA: 413 MS
R AXIS: -16 DEGREES
T AXIS: 12 DEGREES
T OFFSET: 433 MS
VENTRICULAR RATE: 59 BPM

## 2025-01-23 ENCOUNTER — PATIENT OUTREACH (OUTPATIENT)
Dept: PRIMARY CARE | Facility: CLINIC | Age: 70
End: 2025-01-23
Payer: MEDICARE

## 2025-02-07 ENCOUNTER — PATIENT OUTREACH (OUTPATIENT)
Dept: PRIMARY CARE | Facility: CLINIC | Age: 70
End: 2025-02-07
Payer: MEDICARE

## 2025-02-20 ENCOUNTER — HOSPITAL ENCOUNTER (OUTPATIENT)
Dept: RADIOLOGY | Facility: CLINIC | Age: 70
Discharge: HOME | End: 2025-02-20
Payer: MEDICARE

## 2025-02-20 DIAGNOSIS — E78.2 MIXED HYPERLIPIDEMIA: ICD-10-CM

## 2025-02-20 PROCEDURE — 75571 CT HRT W/O DYE W/CA TEST: CPT

## 2025-03-12 ENCOUNTER — PATIENT OUTREACH (OUTPATIENT)
Dept: PRIMARY CARE | Facility: CLINIC | Age: 70
End: 2025-03-12
Payer: MEDICARE

## 2025-03-13 ASSESSMENT — ENCOUNTER SYMPTOMS
ORTHOPNEA: 0
NECK PAIN: 0
PND: 0
HYPERTENSION: 1
SHORTNESS OF BREATH: 0
SWEATS: 0
BLURRED VISION: 0
HEADACHES: 0
PALPITATIONS: 0

## 2025-03-17 ENCOUNTER — APPOINTMENT (OUTPATIENT)
Dept: PRIMARY CARE | Facility: CLINIC | Age: 70
End: 2025-03-17
Payer: MEDICARE

## 2025-03-17 VITALS
HEART RATE: 62 BPM | DIASTOLIC BLOOD PRESSURE: 79 MMHG | SYSTOLIC BLOOD PRESSURE: 134 MMHG | OXYGEN SATURATION: 99 % | TEMPERATURE: 98.2 F | HEIGHT: 66 IN | BODY MASS INDEX: 26.52 KG/M2 | WEIGHT: 165 LBS

## 2025-03-17 DIAGNOSIS — M65.331 TRIGGER MIDDLE FINGER OF RIGHT HAND: ICD-10-CM

## 2025-03-17 DIAGNOSIS — M48.07 SPINAL STENOSIS OF LUMBOSACRAL REGION: ICD-10-CM

## 2025-03-17 DIAGNOSIS — G89.29 CHRONIC BILATERAL LOW BACK PAIN WITH LEFT-SIDED SCIATICA: Primary | ICD-10-CM

## 2025-03-17 DIAGNOSIS — M54.42 CHRONIC BILATERAL LOW BACK PAIN WITH LEFT-SIDED SCIATICA: Primary | ICD-10-CM

## 2025-03-17 DIAGNOSIS — E78.2 MIXED HYPERLIPIDEMIA: ICD-10-CM

## 2025-03-17 DIAGNOSIS — R79.9 ABNORMAL FINDING OF BLOOD CHEMISTRY, UNSPECIFIED: ICD-10-CM

## 2025-03-17 DIAGNOSIS — M43.16 SPONDYLOLISTHESIS AT L4-L5 LEVEL: ICD-10-CM

## 2025-03-17 DIAGNOSIS — I10 ESSENTIAL (PRIMARY) HYPERTENSION: ICD-10-CM

## 2025-03-17 PROBLEM — N20.1 CALCULUS OF URETER: Status: RESOLVED | Noted: 2025-01-14 | Resolved: 2025-03-17

## 2025-03-17 PROBLEM — N20.0 RIGHT NEPHROLITHIASIS: Status: RESOLVED | Noted: 2024-12-27 | Resolved: 2025-03-17

## 2025-03-17 PROBLEM — R23.4 BREAST SKIN CHANGES: Status: RESOLVED | Noted: 2024-01-29 | Resolved: 2025-03-17

## 2025-03-17 PROBLEM — N30.01 ACUTE CYSTITIS WITH HEMATURIA: Status: RESOLVED | Noted: 2024-12-27 | Resolved: 2025-03-17

## 2025-03-17 PROBLEM — R21 RASH: Status: RESOLVED | Noted: 2024-01-29 | Resolved: 2025-03-17

## 2025-03-17 LAB — POC HEMOGLOBIN A1C: 5.8 % (ref 4.2–6.5)

## 2025-03-17 PROCEDURE — 3008F BODY MASS INDEX DOCD: CPT | Performed by: FAMILY MEDICINE

## 2025-03-17 PROCEDURE — 1159F MED LIST DOCD IN RCRD: CPT | Performed by: FAMILY MEDICINE

## 2025-03-17 PROCEDURE — 3078F DIAST BP <80 MM HG: CPT | Performed by: FAMILY MEDICINE

## 2025-03-17 PROCEDURE — 83036 HEMOGLOBIN GLYCOSYLATED A1C: CPT | Performed by: FAMILY MEDICINE

## 2025-03-17 PROCEDURE — 1160F RVW MEDS BY RX/DR IN RCRD: CPT | Performed by: FAMILY MEDICINE

## 2025-03-17 PROCEDURE — 1036F TOBACCO NON-USER: CPT | Performed by: FAMILY MEDICINE

## 2025-03-17 PROCEDURE — G2211 COMPLEX E/M VISIT ADD ON: HCPCS | Performed by: FAMILY MEDICINE

## 2025-03-17 PROCEDURE — 99215 OFFICE O/P EST HI 40 MIN: CPT | Performed by: FAMILY MEDICINE

## 2025-03-17 PROCEDURE — 3075F SYST BP GE 130 - 139MM HG: CPT | Performed by: FAMILY MEDICINE

## 2025-03-17 RX ORDER — LISINOPRIL 40 MG/1
40 TABLET ORAL DAILY
Qty: 90 TABLET | Refills: 1 | Status: SHIPPED | OUTPATIENT
Start: 2025-03-17

## 2025-03-17 ASSESSMENT — ENCOUNTER SYMPTOMS
BLURRED VISION: 0
ACTIVITY CHANGE: 0
WOUND: 0
NECK PAIN: 0
CHEST TIGHTNESS: 0
HYPERTENSION: 1
DIARRHEA: 0
FLANK PAIN: 0
ABDOMINAL PAIN: 0
MYALGIAS: 0
SORE THROAT: 0
TROUBLE SWALLOWING: 0
EYE PAIN: 0
ARTHRALGIAS: 0
HEMATURIA: 0
POLYDIPSIA: 0
ORTHOPNEA: 0
FATIGUE: 0
FREQUENCY: 0
UNEXPECTED WEIGHT CHANGE: 0
JOINT SWELLING: 0
RHINORRHEA: 0
DIAPHORESIS: 0
NECK STIFFNESS: 0
CHILLS: 0
EYE ITCHING: 0
FACIAL SWELLING: 0
CONSTIPATION: 0
AGITATION: 0
FEVER: 0
NAUSEA: 0
WHEEZING: 0
POLYPHAGIA: 0
SINUS PAIN: 0
COUGH: 0
EYE REDNESS: 0
BACK PAIN: 0
SWEATS: 0
NERVOUS/ANXIOUS: 0
ADENOPATHY: 0
VOMITING: 0
EYE DISCHARGE: 0
BLOOD IN STOOL: 0
APPETITE CHANGE: 0
SHORTNESS OF BREATH: 0
SINUS PRESSURE: 0
COLOR CHANGE: 0
PND: 0
HEADACHES: 0
DIZZINESS: 0
VOICE CHANGE: 0
BRUISES/BLEEDS EASILY: 0
DYSURIA: 0
PALPITATIONS: 0
SLEEP DISTURBANCE: 0

## 2025-03-17 NOTE — ASSESSMENT & PLAN NOTE
Hypertension Assessment  Chronic bilateral low back pain with left-sided sciatica [M54.42, G89.29]  Discussion: stage 1  Cardiovascular risk factors: advanced age (older than 55 for men, 65 for women) and family history of premature cardiovascular disease    Hypertension Plan  Medication changes per orders.  Dietary sodium restriction.  Regular aerobic exercise.  Follow up in 1 month.  Patient Education: Reviewed risks of hypertension and principles of   treatment.  Counseling time: counseling time more than 50% of visit: 45 minutes.

## 2025-03-17 NOTE — PROGRESS NOTES
Subjective   Patient ID: Clay Maxwell is a 69 y.o. female who presents for Hypertension.  Today she is accompanied by alone.     Hypertension  This is a chronic problem. The current episode started more than 1 year ago. The problem has been waxing and waning since onset. The problem is controlled. Pertinent negatives include no anxiety, blurred vision, chest pain, headaches, malaise/fatigue, neck pain, orthopnea, palpitations, peripheral edema, PND, shortness of breath or sweats. Agents associated with hypertension include NSAIDs. Risk factors for coronary artery disease include dyslipidemia, family history and post-menopausal state. The current treatment provides significant improvement. Compliance problems include diet and exercise.        Has a right hand middle trigger finger that is getting stuck    Low back is better but still has pain and would like to continue PT after she tries to work on it herself.   Past Medical History:   Diagnosis Date    Acute upper respiratory infection, unspecified 12/07/2016    Viral URI with cough    Arthritis     Calculus of ureter 01/14/2025    Cramp and spasm     Foot cramps    Dysuria 01/25/2016    Burning with urination    Hypertension     Other chest pain 12/02/2015    Chest discomfort    Personal history of colonic polyps 07/26/2022    History of colonic polyps    Personal history of diseases of the blood and blood-forming organs and certain disorders involving the immune mechanism 07/18/2018    History of anemia    Personal history of other drug therapy 04/22/2021    History of pneumococcal vaccination    Personal history of other endocrine, nutritional and metabolic disease 06/05/2017    History of hypercalcemia    Personal history of other endocrine, nutritional and metabolic disease     History of obesity    Plantar fascial fibromatosis 09/11/2014    Plantar fasciitis    Polyp of colon 01/29/2024    Recurrent subluxation of patella, unspecified knee     Patellofemoral  instability    Right nephrolithiasis 12/27/2024    Urinary tract infection     Urinary tract infection, site not specified 01/25/2016    UTI (lower urinary tract infection)     Social History     Socioeconomic History    Marital status:      Spouse name: Not on file    Number of children: Not on file    Years of education: Not on file    Highest education level: Not on file   Occupational History    Not on file   Tobacco Use    Smoking status: Never     Passive exposure: Never    Smokeless tobacco: Never   Vaping Use    Vaping status: Never Used   Substance and Sexual Activity    Alcohol use: Never    Drug use: Never    Sexual activity: Yes     Partners: Male     Birth control/protection: Post-menopausal   Other Topics Concern    Not on file   Social History Narrative    Not on file     Social Drivers of Health     Financial Resource Strain: Not on file   Food Insecurity: Not on file   Transportation Needs: Not on file   Physical Activity: Not on file   Stress: Not on file   Social Connections: Not on file   Intimate Partner Violence: Not on file   Housing Stability: Not on file     Social Drivers of Health     Tobacco Use: Low Risk  (3/17/2025)    Patient History     Smoking Tobacco Use: Never     Smokeless Tobacco Use: Never     Passive Exposure: Never   Alcohol Use: Not on file   Financial Resource Strain: Not on file   Food Insecurity: Not on file   Transportation Needs: Not on file   Physical Activity: Not on file   Stress: Not on file   Social Connections: Not on file   Intimate Partner Violence: Not on file   Depression: Not at risk (12/16/2024)    PHQ-2     PHQ-2 Score: 0   Housing Stability: Not on file   Utilities: Not on file   Digital Equity: Not on file   Health Literacy: Not on file     Immunization History   Administered Date(s) Administered    COVID-19, mRNA, LNP-S, PF, 30 mcg/0.3 mL dose 02/16/2021, 03/09/2021, 09/29/2021    Flu vaccine (IIV4), preservative free *Check age/dose* 10/27/2017,  09/09/2019    Flu vaccine, quadrivalent, high-dose, preservative free, age 65y+ (FLUZONE) 10/03/2016, 09/25/2020, 10/18/2022    Flu vaccine, quadrivalent, no egg protein, age 6 month or greater (FLUCELVAX) 10/19/2018    Flu vaccine, trivalent, preservative free, HIGH-DOSE, age 65y+ (Fluzone) 09/25/2020, 09/17/2021, 10/18/2022, 12/10/2024    Influenza, Seasonal, Quadrivalent, Adjuvanted 11/03/2023    Influenza, Unspecified 09/25/2020    Influenza, injectable, quadrivalent 10/03/2016, 10/19/2018, 09/09/2019    Novavax SARS-CoV-2 Vaccination 11/03/2023    Pfizer COVID-19 vaccine, 12 years and older, (30mcg/0.3mL) (Comirnaty) 11/03/2023    Pfizer COVID-19 vaccine, bivalent, age 12 years and older (30 mcg/0.3 mL) 10/18/2022    Pneumococcal conjugate vaccine, 13-valent (PREVNAR 13) 04/22/2021    Pneumococcal conjugate vaccine, 20-valent (PREVNAR 20) 01/24/2023, 12/05/2023    Zoster vaccine, recombinant, adult (SHINGRIX) 12/27/2019, 01/22/2020, 05/12/2020, 05/20/2020    Zoster, live 05/23/2013     Family History   Problem Relation Name Age of Onset    Stroke Mother Adela Cullen     Hypothyroidism Mother Adelaestela Cullen     Breast cancer Mother Adelaestela Cullen 80    Atrial fibrillation Mother Adelaestela Cullen     Valvular heart disease Mother Adela Cullen     Arthritis Mother Adela Cullen     Cancer Mother Adela Cullen     Hearing loss Mother Adela Cullen     Heart disease Mother Adelaestela Cullen     Hyperlipidemia Mother Adelaestela Cullen     Hypertension Mother Adelaestela Cullen     Pulmonary embolism Father Pepe Cullen 36    Early natural death Father Pepe Cullen     Heart disease Father Pepe Cullen     Hypertension Sister 3 sisters     Hyperlipidemia Sister 3 sisters     Hypertension Brother Polo Cullen     Hyperlipidemia Brother Polo Cullen     Diabetes type II Mother's Brother      Kidney failure Mother's Brother      Stroke Maternal Grandmother Florentino Cullen     Hypertension Maternal  Grandmother Florentino Cullen     Other (CVD) Maternal Grandmother Florentino Cullen     Stroke Maternal Grandfather Florentino Cullen     Hypertension Maternal Grandfather Florentino Cullen     Stroke Paternal Grandfather Florentino EVANS     Hypertension Paternal Grandfather Florentino EVANS     Arthritis Mother's Brother Polo Springmann     Depression Mother's Brother Polo Lacey     Diabetes Mother's Brother Polo Lacey     Hypertension Mother's Brother Polo Lacey     Kidney disease Mother's Brother Polo Lacey      Past Surgical History:   Procedure Laterality Date    COLONOSCOPY W/ POLYPECTOMY  12/04/2017    Complete Colonoscopy For Polyp Removal    EYE SURGERY  01/01/2002    TONSILLECTOMY  05/27/2014    Tonsillectomy    WISDOM TOOTH EXTRACTION  01/01/1986       Current Outpatient Medications:     acetaminophen (Tylenol) 500 mg tablet, Take 1 tablet (500 mg) by mouth twice a day., Disp: , Rfl:     biotin 10,000 mcg capsule, Take 1 capsule (10 mg) by mouth once daily., Disp: , Rfl:     cholecalciferol (Vitamin D-3) 25 MCG (1000 UT) capsule, Take 1 capsule (25 mcg) by mouth once daily., Disp: , Rfl:     clobetasol (Temovate) 0.05 % cream, Apply 1 Application topically twice a day., Disp: , Rfl:     lisinopril 40 mg tablet, Take 1 tablet (40 mg) by mouth once daily., Disp: 90 tablet, Rfl: 0    multivitamin tablet, Take 1 tablet by mouth once daily., Disp: , Rfl:     sulfacetamide sodium-sulfur 9.8-4.8 % cleanser, APPLY INCH  apply to face daily, Disp: , Rfl:   No Known Allergies  Patient Active Problem List   Diagnosis    Essential hypertension    Hx of adenomatous colonic polyps    Chronic bilateral low back pain    Spinal stenosis of lumbosacral region    Osteoarthritis of thoracolumbar spine    Osteopenia after menopause    Mixed hyperlipidemia    Spondylolisthesis at L4-L5 level       Lab Results   Component Value Date    WBC 5.3 12/05/2024    HGB 12.0 12/05/2024    HCT 36.8 12/05/2024     12/05/2024    CHOL 204  (H) 12/05/2024    TRIG 159 (H) 12/05/2024    HDL 46.0 12/05/2024    ALT 17 12/05/2024    AST 26 12/05/2024     12/05/2024    K 3.7 12/05/2024     12/05/2024    CREATININE 0.58 12/05/2024    BUN 11 12/05/2024    CO2 28 12/05/2024    TSH 2.43 12/05/2024     Social Drivers of Health     Tobacco Use: Low Risk  (3/17/2025)    Patient History     Smoking Tobacco Use: Never     Smokeless Tobacco Use: Never     Passive Exposure: Never   Alcohol Use: Not on file   Financial Resource Strain: Not on file   Food Insecurity: Not on file   Transportation Needs: Not on file   Physical Activity: Not on file   Stress: Not on file   Social Connections: Not on file   Intimate Partner Violence: Not on file   Depression: Not at risk (12/16/2024)    PHQ-2     PHQ-2 Score: 0   Housing Stability: Not on file   Utilities: Not on file   Digital Equity: Not on file   Health Literacy: Not on file       Review of Systems   Constitutional:  Negative for activity change, appetite change, chills, diaphoresis, fatigue, fever, malaise/fatigue and unexpected weight change.   HENT:  Negative for congestion, dental problem, drooling, ear discharge, ear pain, facial swelling, hearing loss, nosebleeds, postnasal drip, rhinorrhea, sinus pressure, sinus pain, sneezing, sore throat, tinnitus, trouble swallowing and voice change.    Eyes:  Negative for blurred vision, pain, discharge, redness, itching and visual disturbance.   Respiratory:  Negative for cough, chest tightness, shortness of breath and wheezing.    Cardiovascular:  Negative for chest pain, palpitations, orthopnea, leg swelling and PND.   Gastrointestinal:  Negative for abdominal pain, blood in stool, constipation, diarrhea, nausea and vomiting.   Endocrine: Negative for cold intolerance, heat intolerance, polydipsia, polyphagia and polyuria.   Genitourinary:  Negative for decreased urine volume, dysuria, flank pain, frequency, hematuria and urgency.   Musculoskeletal:  Negative for  "arthralgias, back pain, gait problem, joint swelling, myalgias, neck pain and neck stiffness.   Skin:  Negative for color change, pallor, rash and wound.   Neurological:  Negative for dizziness and headaches.   Hematological:  Negative for adenopathy. Does not bruise/bleed easily.   Psychiatric/Behavioral:  Negative for agitation, behavioral problems and sleep disturbance. The patient is not nervous/anxious.        Objective   /79 Comment: 130/80  Pulse 62   Temp 36.8 °C (98.2 °F) (Oral)   Ht 1.676 m (5' 6\")   Wt 74.8 kg (165 lb)   SpO2 99%   BMI 26.63 kg/m²   BSA: 1.87 meters squared  Growth percentiles: Facility age limit for growth %justen is 20 years. Facility age limit for growth %justen is 20 years.     Physical Exam  Vitals and nursing note reviewed.   Constitutional:       General: She is not in acute distress.     Appearance: Normal appearance. She is not ill-appearing, toxic-appearing or diaphoretic.   HENT:      Head: Normocephalic and atraumatic.      Right Ear: Tympanic membrane, ear canal and external ear normal. There is no impacted cerumen.      Left Ear: Tympanic membrane, ear canal and external ear normal. There is no impacted cerumen.      Nose: No congestion or rhinorrhea.      Mouth/Throat:      Mouth: Mucous membranes are moist.      Pharynx: Oropharynx is clear. No oropharyngeal exudate or posterior oropharyngeal erythema.   Eyes:      General: No scleral icterus.        Right eye: No discharge.         Left eye: No discharge.      Extraocular Movements: Extraocular movements intact.      Conjunctiva/sclera: Conjunctivae normal.      Pupils: Pupils are equal, round, and reactive to light.   Neck:      Vascular: No carotid bruit.   Cardiovascular:      Rate and Rhythm: Normal rate and regular rhythm.      Pulses: Normal pulses.      Heart sounds: Normal heart sounds. No murmur heard.     No friction rub. No gallop.   Pulmonary:      Effort: Pulmonary effort is normal. No respiratory " distress.      Breath sounds: Normal breath sounds. No stridor. No wheezing, rhonchi or rales.   Chest:      Chest wall: No tenderness.   Musculoskeletal:         General: Normal range of motion.        Hands:       Cervical back: Normal range of motion and neck supple. No rigidity or tenderness.      Right lower leg: No edema.      Left lower leg: No edema.      Comments: Snaps open on extension   Lymphadenopathy:      Cervical: No cervical adenopathy.   Skin:     General: Skin is warm and dry.   Neurological:      General: No focal deficit present.      Mental Status: She is alert and oriented to person, place, and time.   Psychiatric:         Mood and Affect: Mood normal.         Behavior: Behavior normal.         Thought Content: Thought content normal.         Judgment: Judgment normal.         Assessment/Plan   Problem List Items Addressed This Visit             ICD-10-CM    Chronic bilateral low back pain - Primary M54.50, G89.29    Relevant Orders    POCT glycosylated hemoglobin (Hb A1C) manually resulted    Spinal stenosis of lumbosacral region M48.07    Mixed hyperlipidemia E78.2    Spondylolisthesis at L4-L5 level M43.16     Other Visit Diagnoses         Codes    Essential (primary) hypertension     I10    Relevant Medications    lisinopril 40 mg tablet    Trigger middle finger of right hand     M65.331    Relevant Orders    POCT glycosylated hemoglobin (Hb A1C) manually resulted    Abnormal finding of blood chemistry, unspecified     R79.9    Relevant Orders    POCT glycosylated hemoglobin (Hb A1C) manually resulted        Discussed water therapy that she can add  A1c was 5.8 and needs to work on her diet   Labs at next visit  Will need hand specialist if the trigger finger does not improve after she stops pressure and repetitive activity  Specialty Consultation notes reviewed  Labs reviewed  Refills sent in  F/U in 3 months for HTN and back pain  Continue current medications  Call if any new  concerns    Current Outpatient Medications   Medication Sig Dispense Refill    acetaminophen (Tylenol) 500 mg tablet Take 1 tablet (500 mg) by mouth twice a day.      biotin 10,000 mcg capsule Take 1 capsule (10 mg) by mouth once daily.      cholecalciferol (Vitamin D-3) 25 MCG (1000 UT) capsule Take 1 capsule (25 mcg) by mouth once daily.      clobetasol (Temovate) 0.05 % cream Apply 1 Application topically twice a day.      multivitamin tablet Take 1 tablet by mouth once daily.      sulfacetamide sodium-sulfur 9.8-4.8 % cleanser APPLY INCH  apply to face daily      lisinopril 40 mg tablet Take 1 tablet (40 mg) by mouth once daily. 90 tablet 1     No current facility-administered medications for this visit.       I personally spent over 50% of a total 40 minutes in counseling and discussion with the patient and coordination of care as described above.

## 2025-06-12 ASSESSMENT — ENCOUNTER SYMPTOMS
SHORTNESS OF BREATH: 0
PALPITATIONS: 0
HYPERTENSION: 1
PND: 0
BLURRED VISION: 0
NECK PAIN: 0
HEADACHES: 0
ORTHOPNEA: 0
SWEATS: 0

## 2025-06-16 ENCOUNTER — APPOINTMENT (OUTPATIENT)
Dept: PRIMARY CARE | Facility: CLINIC | Age: 70
End: 2025-06-16
Payer: MEDICARE

## 2025-06-16 VITALS
TEMPERATURE: 97.6 F | HEART RATE: 60 BPM | WEIGHT: 169.5 LBS | BODY MASS INDEX: 27.24 KG/M2 | DIASTOLIC BLOOD PRESSURE: 80 MMHG | SYSTOLIC BLOOD PRESSURE: 134 MMHG | OXYGEN SATURATION: 98 % | HEIGHT: 66 IN

## 2025-06-16 DIAGNOSIS — R73.9 HYPERGLYCEMIA: ICD-10-CM

## 2025-06-16 DIAGNOSIS — I10 ESSENTIAL (PRIMARY) HYPERTENSION: ICD-10-CM

## 2025-06-16 DIAGNOSIS — E78.2 MIXED HYPERLIPIDEMIA: ICD-10-CM

## 2025-06-16 DIAGNOSIS — M48.07 SPINAL STENOSIS OF LUMBOSACRAL REGION: Primary | ICD-10-CM

## 2025-06-16 LAB — POC HEMOGLOBIN A1C: 5.7 % (ref 4.2–6.5)

## 2025-06-16 RX ORDER — LISINOPRIL 40 MG/1
40 TABLET ORAL DAILY
Qty: 90 TABLET | Refills: 1 | Status: SHIPPED | OUTPATIENT
Start: 2025-06-16

## 2025-06-16 ASSESSMENT — ENCOUNTER SYMPTOMS
PALPITATIONS: 0
LOSS OF SENSATION IN FEET: 0
OCCASIONAL FEELINGS OF UNSTEADINESS: 0
SHORTNESS OF BREATH: 0
HEADACHES: 0
NECK PAIN: 0
DEPRESSION: 0

## 2025-06-16 ASSESSMENT — PATIENT HEALTH QUESTIONNAIRE - PHQ9
2. FEELING DOWN, DEPRESSED OR HOPELESS: NOT AT ALL
1. LITTLE INTEREST OR PLEASURE IN DOING THINGS: NOT AT ALL
SUM OF ALL RESPONSES TO PHQ9 QUESTIONS 1 AND 2: 0

## 2025-06-16 NOTE — ASSESSMENT & PLAN NOTE
Hypertension Assessment  Spinal stenosis of lumbosacral region [M48.07]  Discussion: stage 1  Cardiovascular risk factors: advanced age (older than 55 for men, 65 for women) and family history of premature cardiovascular disease    Hypertension Plan  Medication changes per orders.  Dietary sodium restriction.  Regular aerobic exercise.  Follow up in 1 month.  Patient Education: Reviewed risks of hypertension and principles of   treatment.  Counseling time: counseling time more than 50% of visit: 45 minutes.  Orders:    lisinopril 40 mg tablet; Take 1 tablet (40 mg) by mouth once daily.

## 2025-06-16 NOTE — ASSESSMENT & PLAN NOTE
you have high cholesterol. (hyperlipidemia). This increases your risk for cardiovascular disease.(Heart attack/stroke/circulation);  Continue any prescribed and advised OTC medications, in addition, as reminder:;   For high LDL (>130) use blueberries 1 cup daily;   Plant Stanol dose 950mg twice daily (may need to find online source for this dose), and ;    Metamucil/psyllium fiber 7 grams daily.;  ;  For high Triglycerides:;   Fish oil (start at 2000-3000mg daily);  ;   You should exercise 30 minutes daily. ;  ;   Your nutrition plan needs to emphasizes vegetables, fruits, and lean meat. I recommend the Mediterranean Diet ;  Please find this link to helpful information about lowering your cholesterol: http://www.aafp.org/[email or web address]/0501/[email or web address].html;

## 2025-06-16 NOTE — PROGRESS NOTES
"Subjective   Patient ID: Margaret Maxwell \"Clay\" is a 69 y.o. female who presents for Follow-up.  History of Present Illness  Margaret Maxwell \"Clay\" is a 69 year old female who presents with memory concerns and weight gain.    She has been experiencing memory issues for the past six months, characterized by difficulty recalling information shortly after hearing it. She often forgets names and details, such as her granddaughter mentioning Yesica Rodríguez, and struggles to remember appointments unless they are sent via email due to the volume of messages she receives. She believes her memory may have been impacted by a COVID infection during spring break. No recent changes in medication that could affect memory.    Her weight gain is attributed to decreased physical activity following hip and back issues that began in October, disrupting her routine of regular walking. Additionally, a kidney stone and stent placement further limited her activity. During this period, she consumed more sweets and salty foods, and less nutritional meals, contributing to her weight gain. She has since resumed walking and weight training following improvement in her back pain with physical therapy.    She describes significant stress over the past six months, including her granddaughter's hospitalization for anorexia, her stepdaughter's mother's medical issues, and her 's dental problems. She feels overwhelmed by these responsibilities.    She reports low blood pressure readings, such as 94/62, and occasional dizziness, which she associates with inadequate food intake. She is currently taking lisinopril 40 mg and has not pursued further injections for her back pain since her last one in September.  Answers submitted by the patient for this visit:  High Blood Pressure Questionnaire (Submitted on 6/12/2025)  Chief Complaint: Hypertension  Chronicity: chronic  Onset: more than 1 year ago  Progression since onset: gradually " "improving  Condition status: controlled  anxiety: Yes  blurred vision: No  malaise/fatigue: No  orthopnea: No  peripheral edema: No  PND: No  sweats: No  Agents associated with hypertension: NSAIDs  CAD risks: dyslipidemia, post-menopausal state  Compliance problems: diet, exercise    Review of Systems   Respiratory:  Negative for shortness of breath.    Cardiovascular:  Negative for chest pain and palpitations.   Musculoskeletal:  Negative for neck pain.   Neurological:  Negative for headaches.     ROS otherwise negative aside from what was mentioned above in HPI.    Objective     /80   Pulse 60   Temp 36.4 °C (97.6 °F) (Oral)   Ht 1.676 m (5' 6\")   Wt 76.9 kg (169 lb 8 oz)   SpO2 98%   BMI 27.36 kg/m²      Physical Exam  GENERAL: Alert, cooperative, well developed, no acute distress.  HEENT: Normocephalic, normal oropharynx, moist mucous membranes, ears and nose normal.  CHEST: Clear to auscultation bilaterally, no wheezes, rhonchi, or crackles, lungs normal.  CARDIOVASCULAR: Normal heart rate and rhythm, S1 and S2 normal without murmurs, pulses strong.  ABDOMEN: Soft, non-tender, non-distended, without organomegaly, normal bowel sounds.  EXTREMITIES: No cyanosis or edema, no leg swelling.  NEUROLOGICAL: Cranial nerves grossly intact, moves all extremities without gross motor or sensory deficit.    Assessment & Plan  Memory Impairment  Memory impairment has persisted for six months, characterized by difficulty recalling recent information and names, likely due to stress, lack of focus on personal health, and recent COVID-19 infection. Stress and emotional involvement with family issues can affect memory. She was reassured that there is no indication of Alzheimer's disease, and memory issues are likely stress-related, as stress affects serotonin levels, impacting memory and focus.  - Encourage stress-reducing activities such as yoga or swimming.  - Implement a family calendar to manage schedules and " reduce stress.    Weight Gain  Weight gain of approximately 13 pounds over several months, attributed to decreased physical activity due to back and hip issues, dietary changes, and stress. Steroid use may have contributed to weight gain. Resumed walking and physical therapy exercises have improved back pain and mobility.  - Encourage continuation of walking and physical therapy exercises.  - Suggest incorporating yoga or swimming for additional physical activity.  - Advise on dietary modifications to support weight management.    Hypertension  Blood pressure readings have been low, with some as low as 94/62 mmHg. She is on lisinopril 40 mg. Stress and dietary factors might be affecting blood pressure. Advised to monitor symptoms of dizziness and ensure adequate hydration and nutrition.  - Continue lisinopril 40 mg as prescribed.  - Monitor blood pressure regularly and report any symptoms of dizziness or hypotension.  - Focus on hydration and balanced nutrition.    Type 2 Diabetes Mellitus  The last A1c was mildly elevated. Plans to monitor A1c levels to prevent increase. Advised to focus on diet and exercise to manage blood glucose levels.  - Perform A1c finger stick test in the office.  - Advise on dietary modifications and regular physical activity to manage blood glucose levels.    General Health Maintenance  She takes a multivitamin and vitamin D supplement. Discussed the importance of vitamin D and suggested checking the strength of the supplement to ensure adequacy.  - Review vitamin D supplement strength and adjust if necessary.  - Encourage continued use of multivitamin.    Follow-up  Advised to follow up for blood pressure and A1c monitoring. Discussed the possibility of referral to another specialist if needed for injections.  - Follow up in six months for Medicare wellness visit.  - Monitor A1c and blood pressure, and follow up sooner if there are concerns.  - Consider referral to Dr. Reardon for injections  if needed.    Assessment & Plan  Essential (primary) hypertension  Hypertension Assessment  Spinal stenosis of lumbosacral region [M48.07]  Discussion: stage 1  Cardiovascular risk factors: advanced age (older than 55 for men, 65 for women) and family history of premature cardiovascular disease    Hypertension Plan  Medication changes per orders.  Dietary sodium restriction.  Regular aerobic exercise.  Follow up in 1 month.  Patient Education: Reviewed risks of hypertension and principles of   treatment.  Counseling time: counseling time more than 50% of visit: 45 minutes.  Orders:    lisinopril 40 mg tablet; Take 1 tablet (40 mg) by mouth once daily.     Spinal stenosis of lumbosacral region  Assessment/Plan   Spinal stenosis.  Natural history and expected course discussed. Questions answered.  Proper lifting, bending technique discussed.  Stretching exercises discussed.  Regular aerobic and trunk strengthening exercises discussed.  MRI of the affected area discussed.  OTC analgesics as needed.  Follow-up in as needed if develops pain. .        Mixed hyperlipidemia  you have high cholesterol. (hyperlipidemia). This increases your risk for cardiovascular disease.(Heart attack/stroke/circulation);  Continue any prescribed and advised OTC medications, in addition, as reminder:;   For high LDL (>130) use blueberries 1 cup daily;   Plant Stanol dose 950mg twice daily (may need to find online source for this dose), and ;    Metamucil/psyllium fiber 7 grams daily.;  ;  For high Triglycerides:;   Fish oil (start at 2000-3000mg daily);  ;   You should exercise 30 minutes daily. ;  ;   Your nutrition plan needs to emphasizes vegetables, fruits, and lean meat. I recommend the Mediterranean Diet ;  Please find this link to helpful information about lowering your cholesterol: http://www.aafp.org/afp/2010/0501/p1103.html;          Hyperglycemia    Orders:    POCT glycosylated hemoglobin (Hb A1C) manually resulted      Current  Outpatient Medications   Medication Instructions    acetaminophen (TYLENOL) 500 mg, 2 times daily    biotin 10,000 mcg, Daily RT    cholecalciferol (Vitamin D-3) 25 MCG (1000 UT) capsule 1 capsule, Daily    clobetasol (Temovate) 0.05 % cream 1 Application, 2 times daily    lisinopril 40 mg, oral, Daily    multivitamin tablet 1 tablet, Daily    sulfacetamide sodium-sulfur 9.8-4.8 % cleanser APPLY INCH  apply to face daily     Current Outpatient Medications   Medication Sig Dispense Refill    acetaminophen (Tylenol) 500 mg tablet Take 1 tablet (500 mg) by mouth twice a day.      biotin 10,000 mcg capsule Take 1 capsule (10 mg) by mouth once daily.      cholecalciferol (Vitamin D-3) 25 MCG (1000 UT) capsule Take 1 capsule (25 mcg) by mouth once daily.      clobetasol (Temovate) 0.05 % cream Apply 1 Application topically twice a day.      multivitamin tablet Take 1 tablet by mouth once daily.      sulfacetamide sodium-sulfur 9.8-4.8 % cleanser APPLY INCH  apply to face daily      lisinopril 40 mg tablet Take 1 tablet (40 mg) by mouth once daily. 90 tablet 1     No current facility-administered medications for this visit.       Results  LABS  A1c: elevated (12/2024), mildly elevated (03/2025)    PATHOLOGY  UTI: positive (12/2024)  Lab Review  Office Visit on 03/17/2025   Component Date Value    POC HEMOGLOBIN A1c 03/17/2025 5.8    Admission on 01/15/2025, Discharged on 01/15/2025   Component Date Value    Ventricular Rate 01/15/2025 59     Atrial Rate 01/15/2025 59     FL Interval 01/15/2025 140     QRS Duration 01/15/2025 82     QT Interval 01/15/2025 416     QTC Calculation(Bazett) 01/15/2025 411     P Axis 01/15/2025 4     R Axis 01/15/2025 -16     T Bertram 01/15/2025 12     QRS Count 01/15/2025 10     Q Onset 01/15/2025 225     P Onset 01/15/2025 155     P Offset 01/15/2025 207     T Offset 01/15/2025 433     QTC Fredericia 01/15/2025 413    Office Visit on 12/20/2024   Component Date Value    POC Glucose, Urine  12/20/2024 NEGATIVE     POC Bilirubin, Urine 12/20/2024 NEGATIVE     POC Ketones, Urine 12/20/2024 NEGATIVE     POC Specific Gravity, Ur* 12/20/2024 >=1.030     POC Blood, Urine 12/20/2024 LARGE (3+) (A)     POC PH, Urine 12/20/2024 6.0     POC Protein, Urine 12/20/2024 300 (3+) (A)     POC Urobilinogen, Urine 12/20/2024 1.0     Poc Nitrite, Urine 12/20/2024 POSITIVE (A)     POC Leukocytes, Urine 12/20/2024 LARGE (3+) (A)     Urine Culture 12/20/2024 >=100,000 CFU/mL Escherichia coli (A)            Amy Del Valle MD     This medical note was created with the assistance of artificial intelligence (AI) for documentation purposes. The content has been reviewed and confirmed by the healthcare provider for accuracy and completeness. Patient consented to the use of audio recording and use of AI during their visit.

## 2025-12-10 ENCOUNTER — APPOINTMENT (OUTPATIENT)
Dept: PRIMARY CARE | Facility: CLINIC | Age: 70
End: 2025-12-10
Payer: MEDICARE

## 2025-12-17 ENCOUNTER — APPOINTMENT (OUTPATIENT)
Dept: PRIMARY CARE | Facility: CLINIC | Age: 70
End: 2025-12-17
Payer: MEDICARE

## (undated) DEVICE — SCOPE, LITHOVUE SUD ONLY, GLOBAL STANDARD

## (undated) DEVICE — STENT, URETERAL, INLAY, 6FR X 24CM, W/O GUIDEWIRE

## (undated) DEVICE — CATHETER, URETERAL, CONE TIP, 5 FR, WOVEN, RT & LFT, 70 CM, STERILE

## (undated) DEVICE — CATHETER, IV, ANGIOCATH, 12 G X 3 IN, FEP POLYMER

## (undated) DEVICE — DRESSING, TRANSPARENT, TEGADERM, 4 X 4-3/4 IN, NO LABEL

## (undated) DEVICE — LASER FIBER, HOLMIUM 200 (5/BOX)

## (undated) DEVICE — GUIDEWIRE, STRAIGHT, AMPLATZ SUPER STIFF, 0.035 IN X 180 CM

## (undated) DEVICE — COLLECTION BAG, FLUID, NON-STERILE

## (undated) DEVICE — GUIDEWIRE, DUAL SENSOR, .035 X 150 STRAIGHT,  3CM

## (undated) DEVICE — SLEEVE, VASO PRESS, CALF GARMENT, MEDIUM, GREEN

## (undated) DEVICE — Device

## (undated) DEVICE — SOLUTION, INJECTION, CONTRAST, OMNIPAQUE 240MG 50ML, PLUS PAK